# Patient Record
Sex: FEMALE | Race: WHITE | NOT HISPANIC OR LATINO | Employment: STUDENT | URBAN - METROPOLITAN AREA
[De-identification: names, ages, dates, MRNs, and addresses within clinical notes are randomized per-mention and may not be internally consistent; named-entity substitution may affect disease eponyms.]

---
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
## 2018-01-12 VITALS
SYSTOLIC BLOOD PRESSURE: 102 MMHG | BODY MASS INDEX: 27.06 KG/M2 | OXYGEN SATURATION: 98 % | HEIGHT: 70 IN | RESPIRATION RATE: 16 BRPM | TEMPERATURE: 97.7 F | DIASTOLIC BLOOD PRESSURE: 80 MMHG | HEART RATE: 92 BPM | WEIGHT: 189 LBS

## 2018-01-13 VITALS — TEMPERATURE: 98.4 F | BODY MASS INDEX: 23.46 KG/M2 | WEIGHT: 173 LBS

## 2018-01-13 VITALS
DIASTOLIC BLOOD PRESSURE: 80 MMHG | BODY MASS INDEX: 25.77 KG/M2 | HEIGHT: 70 IN | WEIGHT: 180 LBS | SYSTOLIC BLOOD PRESSURE: 102 MMHG

## 2018-01-14 VITALS
TEMPERATURE: 98.6 F | HEART RATE: 82 BPM | OXYGEN SATURATION: 97 % | WEIGHT: 178 LBS | RESPIRATION RATE: 16 BRPM | SYSTOLIC BLOOD PRESSURE: 110 MMHG | HEIGHT: 70 IN | DIASTOLIC BLOOD PRESSURE: 68 MMHG | BODY MASS INDEX: 25.48 KG/M2

## 2018-01-14 VITALS
WEIGHT: 175 LBS | HEIGHT: 70 IN | OXYGEN SATURATION: 98 % | DIASTOLIC BLOOD PRESSURE: 70 MMHG | TEMPERATURE: 98.5 F | RESPIRATION RATE: 16 BRPM | HEART RATE: 91 BPM | SYSTOLIC BLOOD PRESSURE: 120 MMHG | BODY MASS INDEX: 25.05 KG/M2

## 2018-01-14 VITALS — HEART RATE: 78 BPM | RESPIRATION RATE: 18 BRPM | WEIGHT: 174.5 LBS | TEMPERATURE: 98.6 F | BODY MASS INDEX: 23.67 KG/M2

## 2018-01-14 VITALS — TEMPERATURE: 98.5 F | WEIGHT: 175 LBS | BODY MASS INDEX: 23.73 KG/M2

## 2018-01-14 VITALS
DIASTOLIC BLOOD PRESSURE: 70 MMHG | WEIGHT: 174 LBS | BODY MASS INDEX: 24.91 KG/M2 | HEIGHT: 70 IN | SYSTOLIC BLOOD PRESSURE: 100 MMHG

## 2018-01-15 VITALS
SYSTOLIC BLOOD PRESSURE: 104 MMHG | BODY MASS INDEX: 25.05 KG/M2 | HEIGHT: 70 IN | DIASTOLIC BLOOD PRESSURE: 70 MMHG | WEIGHT: 175 LBS

## 2018-01-22 VITALS — WEIGHT: 172 LBS | BODY MASS INDEX: 23.65 KG/M2 | TEMPERATURE: 98 F

## 2018-07-16 ENCOUNTER — ANNUAL EXAM (OUTPATIENT)
Dept: OBGYN CLINIC | Facility: CLINIC | Age: 18
End: 2018-07-16
Payer: COMMERCIAL

## 2018-07-16 VITALS
SYSTOLIC BLOOD PRESSURE: 102 MMHG | BODY MASS INDEX: 24.35 KG/M2 | WEIGHT: 179.8 LBS | DIASTOLIC BLOOD PRESSURE: 70 MMHG | HEIGHT: 72 IN

## 2018-07-16 DIAGNOSIS — Z30.41 ENCOUNTER FOR SURVEILLANCE OF CONTRACEPTIVE PILLS: Primary | ICD-10-CM

## 2018-07-16 DIAGNOSIS — Z11.3 SCREENING FOR STD (SEXUALLY TRANSMITTED DISEASE): ICD-10-CM

## 2018-07-16 DIAGNOSIS — N76.0 ACUTE VAGINITIS: ICD-10-CM

## 2018-07-16 PROCEDURE — 99213 OFFICE O/P EST LOW 20 MIN: CPT | Performed by: OBSTETRICS & GYNECOLOGY

## 2018-07-16 RX ORDER — IBUPROFEN 800 MG/1
TABLET ORAL
COMMUNITY
End: 2018-07-30

## 2018-07-16 RX ORDER — ALBUTEROL SULFATE 90 UG/1
1-2 AEROSOL, METERED RESPIRATORY (INHALATION) EVERY 6 HOURS PRN
COMMUNITY
Start: 2017-05-24

## 2018-07-16 RX ORDER — FLUCONAZOLE 150 MG/1
150 TABLET ORAL ONCE
Qty: 1 TABLET | Refills: 0 | Status: SHIPPED | OUTPATIENT
Start: 2018-07-16 | End: 2018-07-16

## 2018-07-16 RX ORDER — NORGESTIMATE AND ETHINYL ESTRADIOL 7DAYSX3 28
1 KIT ORAL DAILY
Qty: 84 TABLET | Refills: 3 | Status: SHIPPED | OUTPATIENT
Start: 2018-07-16 | End: 2018-08-01 | Stop reason: SDUPTHER

## 2018-07-16 RX ORDER — NORGESTIMATE AND ETHINYL ESTRADIOL 7DAYSX3 28
1 KIT ORAL DAILY
Refills: 0 | COMMUNITY
Start: 2018-05-12 | End: 2018-07-16 | Stop reason: SDUPTHER

## 2018-07-16 NOTE — PROGRESS NOTES
Assessment/Plan:     Cultures obtained for bacteria vaginosis, Candida, leukorrhea panel  Patient will call for results in 1 week  She will start Diflucan x1 dose  She expresses concerns regarding recurrent bacteria vaginosis as she did test positive in October  Continue OCPs x1 year  Return to office in 1 year     Diagnoses and all orders for this visit:    Encounter for surveillance of contraceptive pills    Screening for STD (sexually transmitted disease)    Other orders  -     albuterol (VENTOLIN HFA) 90 mcg/act inhaler; Inhale 1-2 puffs  -     ibuprofen (MOTRIN) 800 mg tablet; Take by mouth  -     TRI FEMYNOR 0 18/0 215/0 25 MG-35 MCG per tablet; Take 1 tablet by mouth daily          Subjective:     Patient ID: Lennox Lester is a 21 y o  female  HPI     This is a 27-year-old female G0 who presents complaining of intermittent vaginal discharge with odor  She started birth control pills approximately 1 year ago which had improved her menstrual flow and cramping  Her cycles are now regular every 4 weeks lasting 5 days with no breakthrough bleeding  She has been sexually active with a new partner in the last 6 months  She does use condoms intermittently  Review of Systems   Constitutional: Negative for fatigue, fever and unexpected weight change  Respiratory: Negative for cough, chest tightness, shortness of breath and wheezing  Cardiovascular: Negative  Negative for chest pain and palpitations  Gastrointestinal: Negative  Negative for abdominal distention, abdominal pain, blood in stool, constipation, diarrhea, nausea and vomiting  Genitourinary: Positive for vaginal discharge  Negative for difficulty urinating, dyspareunia, dysuria, flank pain, frequency, genital sores, hematuria, pelvic pain, urgency, vaginal bleeding and vaginal pain  Skin: Negative for rash  Objective:     Physical Exam      External genitalia is evident of slight erythema  The vagina is well estrogenized  Cervix is small the os is closed  She does have a watery discharge  Vaginal pH is normal there is also adherent discharge the vaginal walls consistent with candidiasis  Cervix reveals no lesions

## 2018-07-18 LAB
A VAGINAE DNA VAG NAA+PROBE-LOG#: 5.7 LOG (CELLS/ML)
C GLABRATA DNA VAG QL NAA+PROBE: NOT DETECTED
C TRACH RRNA SPEC QL NAA+PROBE: NOT DETECTED
CANDIDA DNA VAG QL NAA+PROBE: DETECTED
G VAGINALIS DNA VAG NAA+PROBE-LOG#: >8 LOG (CELLS/ML)
LACTOBACILLUS DNA VAG NAA+PROBE-LOG#: 7.2 LOG (CELLS/ML)
MEGASPHAERA SP DNA VAG NAA+PROBE-LOG#: NOT DETECTED LOG (CELLS/ML)
N GONORRHOEA RRNA SPEC QL NAA+PROBE: NOT DETECTED
SL AMB BV CATEGORY:: ABNORMAL
SL AMB C. PARAPSILOSIS, DNA: NOT DETECTED
SL AMB C. TROPICALIS, DNA: NOT DETECTED
T VAGINALIS RRNA SPEC QL NAA+PROBE: NOT DETECTED

## 2018-07-19 DIAGNOSIS — N76.0 ACUTE VAGINITIS: Primary | ICD-10-CM

## 2018-07-19 RX ORDER — METRONIDAZOLE 500 MG/1
500 TABLET ORAL EVERY 8 HOURS SCHEDULED
Qty: 14 TABLET | Refills: 0 | Status: SHIPPED | OUTPATIENT
Start: 2018-07-19 | End: 2018-07-26

## 2018-07-20 ENCOUNTER — TELEPHONE (OUTPATIENT)
Dept: OBGYN CLINIC | Facility: CLINIC | Age: 18
End: 2018-07-20

## 2018-07-20 NOTE — TELEPHONE ENCOUNTER
----- Message from Beto Coppola DO sent at 7/19/2018  4:36 PM EDT -----  Inform pt cultures c/w candida, already tx w diflucan  Also "equivical BV"   Can try flagyl x 7 days, I sent to [facility]

## 2018-07-24 NOTE — TELEPHONE ENCOUNTER
Pt informed - pt took Diflucan - sx not resolved - will take flagyl now x 7 days - recall if sx persist after tx

## 2018-07-30 ENCOUNTER — OFFICE VISIT (OUTPATIENT)
Dept: PEDIATRICS CLINIC | Facility: CLINIC | Age: 18
End: 2018-07-30
Payer: COMMERCIAL

## 2018-07-30 VITALS — WEIGHT: 177.2 LBS | TEMPERATURE: 98.4 F | HEART RATE: 80 BPM | RESPIRATION RATE: 16 BRPM | BODY MASS INDEX: 24.03 KG/M2

## 2018-07-30 DIAGNOSIS — H10.32 ACUTE BACTERIAL CONJUNCTIVITIS OF LEFT EYE: Primary | ICD-10-CM

## 2018-07-30 PROCEDURE — 99214 OFFICE O/P EST MOD 30 MIN: CPT | Performed by: PEDIATRICS

## 2018-07-30 RX ORDER — CIPROFLOXACIN HYDROCHLORIDE 3.5 MG/ML
SOLUTION/ DROPS TOPICAL
Qty: 5 ML | Refills: 0 | Status: SHIPPED | OUTPATIENT
Start: 2018-07-30 | End: 2018-08-04

## 2018-07-30 RX ORDER — METRONIDAZOLE 500 MG/1
TABLET ORAL EVERY 8 HOURS SCHEDULED
COMMUNITY
End: 2018-12-31 | Stop reason: ALTCHOICE

## 2018-07-30 NOTE — PROGRESS NOTES
Assessment/Plan:    No problem-specific Assessment & Plan notes found for this encounter  Diagnoses and all orders for this visit:    Acute bacterial conjunctivitis of left eye  -     ciprofloxacin (CILOXAN) 0 3 % ophthalmic solution; 1 drop on affected eye bid for 5 days    Other orders  -     metroNIDAZOLE (FLAGYL) 500 mg tablet; Take by mouth every 8 (eight) hours          Subjective: pink eye     Patient ID: Leodan Bennett is a 21 y o  female  HPI 18 y/o who started with a left eye being red and guppy,this am was pasted shot,she gets them often,no other symptoms no fever    The following portions of the patient's history were reviewed and updated as appropriate: allergies, current medications, past family history, past medical history, past social history, past surgical history and problem list     Review of Systems   Constitutional: Negative  HENT: Negative  Eyes: Positive for discharge and redness  Respiratory: Negative  Cardiovascular: Negative  Gastrointestinal: Negative  Endocrine: Negative  Genitourinary: Negative  Musculoskeletal: Negative  Skin: Negative  Allergic/Immunologic: Negative  Neurological: Negative  Hematological: Negative  Psychiatric/Behavioral: Negative  Objective:      Pulse 80   Temp 98 4 °F (36 9 °C) (Oral)   Resp 16   Wt 80 4 kg (177 lb 3 2 oz)   LMP 07/04/2018 (Approximate)   BMI 24 03 kg/m²          Physical Exam   Constitutional: She appears well-developed and well-nourished  HENT:   Head: Normocephalic and atraumatic  Right Ear: External ear normal    Nose: Nose normal    Eyes: Conjunctivae and EOM are normal  Pupils are equal, round, and reactive to light  Right eye exhibits discharge  Neck: Normal range of motion  Neck supple  Cardiovascular: Normal rate, regular rhythm and intact distal pulses  No murmur heard  Pulmonary/Chest: Effort normal and breath sounds normal  No respiratory distress     Abdominal: Soft    Musculoskeletal: Normal range of motion  Neurological: She is alert  Skin: Skin is warm  Vitals reviewed

## 2018-08-01 DIAGNOSIS — Z30.41 ENCOUNTER FOR SURVEILLANCE OF CONTRACEPTIVE PILLS: ICD-10-CM

## 2018-08-05 RX ORDER — NORGESTIMATE AND ETHINYL ESTRADIOL 7DAYSX3 28
KIT ORAL
Qty: 84 TABLET | Refills: 0 | Status: SHIPPED | OUTPATIENT
Start: 2018-08-05 | End: 2020-03-30 | Stop reason: SDUPTHER

## 2018-12-31 ENCOUNTER — OFFICE VISIT (OUTPATIENT)
Dept: OBGYN CLINIC | Facility: CLINIC | Age: 18
End: 2018-12-31
Payer: COMMERCIAL

## 2018-12-31 VITALS
DIASTOLIC BLOOD PRESSURE: 70 MMHG | SYSTOLIC BLOOD PRESSURE: 110 MMHG | WEIGHT: 178 LBS | BODY MASS INDEX: 24.11 KG/M2 | HEIGHT: 72 IN

## 2018-12-31 DIAGNOSIS — N89.8 VAGINAL DISCHARGE: Primary | ICD-10-CM

## 2018-12-31 PROBLEM — J45.990 BRONCHOSPASM, EXERCISE-INDUCED: Status: ACTIVE | Noted: 2017-06-05

## 2018-12-31 PROBLEM — J30.2 SEASONAL ALLERGIC RHINITIS: Status: ACTIVE | Noted: 2017-06-05

## 2018-12-31 PROBLEM — N63.0 BREAST LUMP IN FEMALE: Status: ACTIVE | Noted: 2017-03-15

## 2018-12-31 PROBLEM — N94.6 DYSMENORRHEA: Status: ACTIVE | Noted: 2017-05-19

## 2018-12-31 PROCEDURE — 99213 OFFICE O/P EST LOW 20 MIN: CPT | Performed by: NURSE PRACTITIONER

## 2018-12-31 NOTE — PATIENT INSTRUCTIONS
HPV (Human Papillomavirus)   AMBULATORY CARE:   Human Papillomavirus (HPV)  is the most common infection spread by sexual contact  It can also be spread from a mother to her baby during delivery  HPV may cause oral and genital warts or tumors in your nose, mouth, throat, and lungs  HPV may also cause vaginal, penile, and anal cancers  You may not show symptoms of any of these conditions for several years after being exposed to HPV  Common symptoms include the following:   · Painless warts    · Genital or anal discharge, bleeding, itching, or pain    · Pain when you urinate  Contact your healthcare provider if:   · You have warts in your genital or anal area  · You have genital or anal discharge, bleeding, itching, or pain  · You have pain when you urinate  · You have questions or concerns about your condition or care  Treatment for HPV  includes relieving symptoms  There is no cure for HPV  There is treatment for the conditions that are caused by HPV  You will need to be closely monitored for these conditions  Ask your healthcare provider for more information about monitoring, conditions caused by HPV, and treatments  Prevent HPV infection:  Vaccinations can help stop the spread of HPV  The vaccine is most effective if given before sexual activity begins  This allows your body to build almost complete protection against HPV before having contact with the virus  The HPV vaccine is still effective up to the age of 32 if it is given after sexual activity has already begun  Who should get the HPV vaccine: The first dose of the vaccine may be given as early as 5years of age   The following should also get the vaccine:  · All females and males 6to 15years of age    · Females 15 through 32years of age, and males 15 through 24years of age, who have not been vaccinated     · Men up to 32years of age who have sex with other men, and have not been vaccinated     · Anyone with a weak immune system, including infection with HIV, up to 32years of age  Who should not get the vaccine or should wait to get it:  Do not get a second dose if you had a severe allergic reaction to the first dose  Do not get the vaccine if you are pregnant  If you are sick, wait to get the vaccine until symptoms go away  How the vaccine is given:  The HPV vaccine can be given with other vaccinations  The vaccine is given in 3 doses to adults:  · The first dose  is given at any time  · The second dose  is given 1 to 2 months after the first dose  · The third dose  is given 6 months after the first dose  More information about how the vaccine is given: You may need 1 more dose of the HPV vaccine if any of the following is true:  · You only received 1 dose of the HPV vaccine before 13years of age    · You received 2 doses of the HPV vaccine less than 5 months apart before 13years of age  Follow up with your healthcare provider as directed:  Write down your questions so you remember to ask them during your visits  © 2017 [facility] [facility] Information is for End User's use only and may not be sold, redistributed or otherwise used for commercial purposes  All illustrations and images included in CareNotes® are the copyrighted property of [employer]  or [address]  The above information is an  only  It is not intended as medical advice for individual conditions or treatments  Talk to your doctor, nurse or pharmacist before following any medical regimen to see if it is safe and effective for you

## 2018-12-31 NOTE — PROGRESS NOTES
Brian Pardo is a 19 y o  female who presents for evaluation of an abnormal vaginal discharge  Symptoms have been present for 2 weeks  Vaginal symptoms: discharge described as creamy, curd-like and milky, odor and vulvar itching  Contraception: OCP (estrogen/progesterone)  She denies blisters, bumps, burning and pain Sexually transmitted infection risk: possible STD exposure  The following portions of the patient's history were reviewed and updated as appropriate: allergies, current medications, past family history, past medical history, past social history, past surgical history and problem list     Review of Systems  Pertinent items are noted in HPI       Objective     Physical Exam   Constitutional: Vital signs are normal  She appears well-developed and well-nourished  Genitourinary: Vagina normal  Pelvic exam was performed with patient supine  There is no rash, tenderness or lesion on the right labia  There is no rash, tenderness or lesion on the left labia  Cervix is nulliparous  Cervix exhibits discharge (small amount of white mucous appearing discharge  No odor)  Cervix does not exhibit friability  HENT:   Head: Normocephalic and atraumatic  Neck: Neck supple  Neurological: She is alert  Nursing note and vitals reviewed  Assessment/Plan   Nora Galarza was seen today for vaginitis  Diagnoses and all orders for this visit:    Vaginal discharge  -     Sureswab(R), Vaginosis/Vaginitis Plus      Will inform patient of results when they become available  If treatment is warranted it will be sent to pharmacy on file  All questions and concerns addressed and patient verbalized understanding

## 2019-01-03 LAB
A VAGINAE DNA VAG NAA+PROBE-LOG#: 6.2 LOG (CELLS/ML)
C GLABRATA DNA VAG QL NAA+PROBE: NOT DETECTED
C TRACH RRNA SPEC QL NAA+PROBE: NOT DETECTED
CANDIDA DNA VAG QL NAA+PROBE: DETECTED
G VAGINALIS DNA VAG NAA+PROBE-LOG#: >8 LOG (CELLS/ML)
LACTOBACILLUS DNA VAG NAA+PROBE-LOG#: NOT DETECTED LOG (CELLS/ML)
MEGASPHAERA SP DNA VAG NAA+PROBE-LOG#: NOT DETECTED LOG (CELLS/ML)
N GONORRHOEA RRNA SPEC QL NAA+PROBE: NOT DETECTED
SL AMB BV CATEGORY:: ABNORMAL
SL AMB C. PARAPSILOSIS, DNA: NOT DETECTED
SL AMB C. TROPICALIS, DNA: NOT DETECTED
T VAGINALIS RRNA SPEC QL NAA+PROBE: NOT DETECTED

## 2019-01-04 ENCOUNTER — TELEPHONE (OUTPATIENT)
Dept: OBGYN CLINIC | Facility: CLINIC | Age: 19
End: 2019-01-04

## 2019-01-04 DIAGNOSIS — B96.89 BACTERIAL VAGINOSIS: Primary | ICD-10-CM

## 2019-01-04 DIAGNOSIS — N76.0 BACTERIAL VAGINOSIS: Primary | ICD-10-CM

## 2019-01-04 RX ORDER — METRONIDAZOLE 500 MG/1
500 TABLET ORAL EVERY 12 HOURS SCHEDULED
Qty: 14 TABLET | Refills: 0 | Status: SHIPPED | OUTPATIENT
Start: 2019-01-04 | End: 2019-01-11

## 2019-01-04 NOTE — TELEPHONE ENCOUNTER
Patient informed of +BV  Flagyl sent to pharmacy  All questions and concerns addressed and patient verbalized understanding  Diagnoses and all orders for this visit:    Bacterial vaginosis  -     metroNIDAZOLE (FLAGYL) 500 mg tablet;  Take 1 tablet (500 mg total) by mouth every 12 (twelve) hours for 7 days

## 2019-06-10 RX ORDER — NORGESTIMATE AND ETHINYL ESTRADIOL 7DAYSX3 28
1 KIT ORAL DAILY
COMMUNITY
End: 2019-06-10

## 2019-06-11 ENCOUNTER — OFFICE VISIT (OUTPATIENT)
Dept: FAMILY MEDICINE CLINIC | Facility: CLINIC | Age: 19
End: 2019-06-11
Payer: COMMERCIAL

## 2019-06-11 ENCOUNTER — TELEPHONE (OUTPATIENT)
Dept: FAMILY MEDICINE CLINIC | Facility: CLINIC | Age: 19
End: 2019-06-11

## 2019-06-11 VITALS
RESPIRATION RATE: 14 BRPM | WEIGHT: 184 LBS | SYSTOLIC BLOOD PRESSURE: 102 MMHG | HEIGHT: 72 IN | TEMPERATURE: 98.2 F | DIASTOLIC BLOOD PRESSURE: 68 MMHG | BODY MASS INDEX: 24.92 KG/M2 | HEART RATE: 72 BPM | OXYGEN SATURATION: 98 %

## 2019-06-11 DIAGNOSIS — F32.1 CURRENT MODERATE EPISODE OF MAJOR DEPRESSIVE DISORDER WITHOUT PRIOR EPISODE (HCC): ICD-10-CM

## 2019-06-11 DIAGNOSIS — Z00.00 ANNUAL PHYSICAL EXAM: Primary | ICD-10-CM

## 2019-06-11 DIAGNOSIS — R53.83 FATIGUE, UNSPECIFIED TYPE: Primary | ICD-10-CM

## 2019-06-11 PROCEDURE — 3008F BODY MASS INDEX DOCD: CPT | Performed by: FAMILY MEDICINE

## 2019-06-11 PROCEDURE — 99385 PREV VISIT NEW AGE 18-39: CPT | Performed by: FAMILY MEDICINE

## 2019-06-11 PROCEDURE — 1036F TOBACCO NON-USER: CPT | Performed by: FAMILY MEDICINE

## 2019-06-11 PROCEDURE — 99213 OFFICE O/P EST LOW 20 MIN: CPT | Performed by: FAMILY MEDICINE

## 2019-06-11 RX ORDER — SERTRALINE HYDROCHLORIDE 25 MG/1
TABLET, FILM COATED ORAL
Qty: 60 TABLET | Refills: 5 | Status: SHIPPED | OUTPATIENT
Start: 2019-06-11 | End: 2020-11-25 | Stop reason: ALTCHOICE

## 2019-06-12 LAB
25(OH)D3 SERPL-MCNC: 32 NG/ML (ref 30–100)
ALBUMIN SERPL-MCNC: 4.4 G/DL (ref 3.6–5.1)
ALBUMIN/GLOB SERPL: 1.8 (CALC) (ref 1–2.5)
ALP SERPL-CCNC: 63 U/L (ref 33–115)
ALT SERPL-CCNC: 22 U/L (ref 6–29)
AST SERPL-CCNC: 20 U/L (ref 10–30)
BASOPHILS # BLD AUTO: 52 CELLS/UL (ref 0–200)
BASOPHILS NFR BLD AUTO: 0.7 %
BILIRUB SERPL-MCNC: 0.8 MG/DL (ref 0.2–1.2)
BUN SERPL-MCNC: 15 MG/DL (ref 7–25)
BUN/CREAT SERPL: NORMAL (CALC) (ref 6–22)
CALCIUM SERPL-MCNC: 9.5 MG/DL (ref 8.6–10.2)
CHLORIDE SERPL-SCNC: 106 MMOL/L (ref 98–110)
CO2 SERPL-SCNC: 27 MMOL/L (ref 20–32)
CREAT SERPL-MCNC: 0.77 MG/DL (ref 0.5–1.1)
EOSINOPHIL # BLD AUTO: 200 CELLS/UL (ref 15–500)
EOSINOPHIL NFR BLD AUTO: 2.7 %
ERYTHROCYTE [DISTWIDTH] IN BLOOD BY AUTOMATED COUNT: 12.1 % (ref 11–15)
FERRITIN SERPL-MCNC: 45 NG/ML (ref 10–154)
GLOBULIN SER CALC-MCNC: 2.5 G/DL (CALC) (ref 1.9–3.7)
GLUCOSE SERPL-MCNC: 78 MG/DL (ref 65–99)
HCT VFR BLD AUTO: 40.7 % (ref 35–45)
HGB BLD-MCNC: 13.4 G/DL (ref 11.7–15.5)
LYMPHOCYTES # BLD AUTO: 1761 CELLS/UL (ref 850–3900)
LYMPHOCYTES NFR BLD AUTO: 23.8 %
MCH RBC QN AUTO: 30.1 PG (ref 27–33)
MCHC RBC AUTO-ENTMCNC: 32.9 G/DL (ref 32–36)
MCV RBC AUTO: 91.5 FL (ref 80–100)
MONOCYTES # BLD AUTO: 555 CELLS/UL (ref 200–950)
MONOCYTES NFR BLD AUTO: 7.5 %
NEUTROPHILS # BLD AUTO: 4832 CELLS/UL (ref 1500–7800)
NEUTROPHILS NFR BLD AUTO: 65.3 %
PLATELET # BLD AUTO: 186 THOUSAND/UL (ref 140–400)
PMV BLD REES-ECKER: 11.3 FL (ref 7.5–12.5)
POTASSIUM SERPL-SCNC: 4.4 MMOL/L (ref 3.5–5.3)
PROT SERPL-MCNC: 6.9 G/DL (ref 6.1–8.1)
RBC # BLD AUTO: 4.45 MILLION/UL (ref 3.8–5.1)
SL AMB EGFR AFRICAN AMERICAN: 129 ML/MIN/1.73M2
SL AMB EGFR NON AFRICAN AMERICAN: 111 ML/MIN/1.73M2
SODIUM SERPL-SCNC: 141 MMOL/L (ref 135–146)
TSH SERPL-ACNC: 0.89 MIU/L
WBC # BLD AUTO: 7.4 THOUSAND/UL (ref 3.8–10.8)

## 2019-08-19 ENCOUNTER — OFFICE VISIT (OUTPATIENT)
Dept: FAMILY MEDICINE CLINIC | Facility: CLINIC | Age: 19
End: 2019-08-19
Payer: COMMERCIAL

## 2019-08-19 ENCOUNTER — HOSPITAL ENCOUNTER (OUTPATIENT)
Dept: RADIOLOGY | Facility: HOSPITAL | Age: 19
Discharge: HOME | End: 2019-08-19
Attending: FAMILY MEDICINE
Payer: COMMERCIAL

## 2019-08-19 ENCOUNTER — TELEPHONE (OUTPATIENT)
Dept: FAMILY MEDICINE CLINIC | Facility: CLINIC | Age: 19
End: 2019-08-19

## 2019-08-19 ENCOUNTER — TELEPHONE (OUTPATIENT)
Dept: OTHER | Facility: OTHER | Age: 19
End: 2019-08-19

## 2019-08-19 VITALS
WEIGHT: 184 LBS | OXYGEN SATURATION: 98 % | RESPIRATION RATE: 16 BRPM | HEART RATE: 70 BPM | BODY MASS INDEX: 24.92 KG/M2 | TEMPERATURE: 98.4 F | DIASTOLIC BLOOD PRESSURE: 79 MMHG | HEIGHT: 72 IN | SYSTOLIC BLOOD PRESSURE: 98 MMHG

## 2019-08-19 DIAGNOSIS — M25.572 ACUTE LEFT ANKLE PAIN: ICD-10-CM

## 2019-08-19 DIAGNOSIS — M25.572 ACUTE LEFT ANKLE PAIN: Primary | ICD-10-CM

## 2019-08-19 PROCEDURE — 3008F BODY MASS INDEX DOCD: CPT | Performed by: FAMILY MEDICINE

## 2019-08-19 PROCEDURE — 73610 X-RAY EXAM OF ANKLE: CPT

## 2019-08-19 PROCEDURE — 99213 OFFICE O/P EST LOW 20 MIN: CPT | Performed by: FAMILY MEDICINE

## 2019-08-19 PROCEDURE — 73630 X-RAY EXAM OF FOOT: CPT

## 2019-08-19 PROCEDURE — 1036F TOBACCO NON-USER: CPT | Performed by: FAMILY MEDICINE

## 2019-08-19 NOTE — TELEPHONE ENCOUNTER
Sent this Message to Dr Robinson Carias via Kendal @ [phone]    [phone]/[facility] [employer] [facility] [facility]/ Wilfrido Davis  25- / Stat x-ray Results    Alberto Dias to call back in 20-30 minutes if no call back from Dr Robinson Carias

## 2019-08-19 NOTE — PROGRESS NOTES
Assessment/Plan:   Greer Cogan was seen today for ankle injury  Diagnoses and all orders for this visit:    Acute left ankle pain    suspect fracture vs torn ligament   x-ray stat  Ice and elevate  Stay in boot   Plan to ortho or sports med based on results     There are no Patient Instructions on file for this visit  Return in about 1 month (around 9/19/2019) for mood check with Dr Ayde Rodrigues   Subjective:    HPI  Greer Cogan is a 22 y o  female who presents with:  Chief Complaint     Ankle Injury        HPI     Ankle Injury      Additional comments: x 2 wks           Last edited by Farhad Contreras MA on 8/19/2019  4:21 PM  (History)        ---Above per clinical staff & reviewed  ---        Running in sand, rolled ankle   Ice and elevated it   Runs so talked to    Walking after 5 minutes pain and tingling   Swelling immediately   Black and blue at bottom of foot   Pain next day severe hard to walk 7/10 pain   No injury on that ankle but knee and hip surgery on that side    Dr Jaylin Mirza at [place] [phone]    On ocp for week   Not SA for over a month       The following portions of the patient's history were reviewed and updated as appropriate: allergies, current medications, past family history, past medical history, past social history, past surgical history and problem list   Review of Systems  ROS:  all others negative - no chest pain, SOB, normal urine and bowels  no GERD  sleeping well  mood good    Objective:    BP 98/79 (BP Location: Left arm)   Pulse 70   Temp 98 4 °F (36 9 °C)   Resp 16   Ht 6' (1 829 m)   Wt 83 5 kg (184 lb)   SpO2 98%   BMI 24 95 kg/m²   Wt Readings from Last 3 Encounters:   08/19/19 83 5 kg (184 lb)   06/11/19 83 5 kg (184 lb)   12/31/18 80 7 kg (178 lb)     BP Readings from Last 3 Encounters:   08/19/19 98/79   06/11/19 102/68   12/31/18 110/70     Current Outpatient Medications   Medication Sig Dispense Refill    albuterol (VENTOLIN HFA) 90 mcg/act inhaler Inhale 1-2 puffs every 6 (six) hours as needed for wheezing       sertraline (ZOLOFT) 25 mg tablet 1/2 tab daily x 1 week, then 1 tab daily x 1 week, then 1 1/2 tabs daily x 1 week, then 2 tabs daily 60 tablet 5    TRI FEMYNOR 0 18/0 215/0 25 MG-35 MCG per tablet TAKE 1 TABLET BY MOUTH EVERY DAY 84 tablet 0     No current facility-administered medications for this visit  Physical Exam   Musculoskeletal:        Left ankle: She exhibits decreased range of motion and swelling  She exhibits no deformity, no laceration and normal pulse  Tenderness  Lateral malleolus, CF ligament and head of 5th metatarsal tenderness found  No medial malleolus and no proximal fibula tenderness found  Feet:       Constitutional: she appears well-developed and well-nourished  Lymphadenopathy: she has no cervical adenopathy  Neurological: she is alert and oriented to person, place, and time  Skin: Skin is warm and dry  Psychiatric: she has a normal mood and affect   her behavior is normal

## 2019-08-19 NOTE — TELEPHONE ENCOUNTER
Concern: broke left foot,[place]  Symptoms: pain, swollen and bruised  Duration: 2weeks  Remedies tried at home for relief: in boot by [employer]

## 2019-08-19 NOTE — TELEPHONE ENCOUNTER
Placed call to patient, left foot bruising (darker in color, was purple but has gone down since injury was 2 weeks ago),  painful to walk on without boot  Can wiggle toes and has feeling in a digits  Has been in a boot given to her by  at [facility]  Patient is currently home now  Patient is requesting an xray as she did not have one  Advised id check with provider in office or if patient needs to come in for a visit

## 2019-08-20 ENCOUNTER — TELEPHONE (OUTPATIENT)
Dept: FAMILY MEDICINE CLINIC | Facility: CLINIC | Age: 19
End: 2019-08-20

## 2019-08-20 NOTE — TELEPHONE ENCOUNTER
Placed call to patient - reviewed Dr Jeananne Buerger recommendation as noted on the xray result on 08/19/19 "there is no fracture in the foot or ankle  I recommend she follow up with the sports medicine doctor at this time  I believe she wants to do this at school "    Patient understood and had no additional questions

## 2019-08-20 NOTE — TELEPHONE ENCOUNTER
Received call from [facility]  STAT xray of foot/ankle was negative for fracture  I called pt with results

## 2019-08-20 NOTE — RESULT ENCOUNTER NOTE
Call patient with x-ray results - there is no fracture in the foot or ankle  I recommend she follow up with the sports medicine doctor at this time  I believe she wants to do this at school

## 2019-08-20 NOTE — TELEPHONE ENCOUNTER
Concern: injured foot   Symptoms:  Duration:  Remedies tried at home for relief: boot and had x-ray  Knows the x-ray was negative  Asking if she should have a MRI done   Please let the patient know

## 2019-10-18 ENCOUNTER — OFFICE VISIT (OUTPATIENT)
Dept: FAMILY MEDICINE CLINIC | Facility: CLINIC | Age: 19
End: 2019-10-18
Payer: COMMERCIAL

## 2019-10-18 VITALS
HEIGHT: 72 IN | HEART RATE: 88 BPM | OXYGEN SATURATION: 98 % | WEIGHT: 178.4 LBS | RESPIRATION RATE: 16 BRPM | BODY MASS INDEX: 24.16 KG/M2 | DIASTOLIC BLOOD PRESSURE: 72 MMHG | TEMPERATURE: 97.8 F | SYSTOLIC BLOOD PRESSURE: 104 MMHG

## 2019-10-18 DIAGNOSIS — F32.A DEPRESSIVE DISORDER: ICD-10-CM

## 2019-10-18 DIAGNOSIS — R11.2 NAUSEA AND VOMITING, INTRACTABILITY OF VOMITING NOT SPECIFIED, UNSPECIFIED VOMITING TYPE: Primary | ICD-10-CM

## 2019-10-18 LAB — SL AMB POCT URINE HCG: NEGATIVE

## 2019-10-18 PROCEDURE — 3008F BODY MASS INDEX DOCD: CPT | Performed by: FAMILY MEDICINE

## 2019-10-18 PROCEDURE — 99214 OFFICE O/P EST MOD 30 MIN: CPT | Performed by: FAMILY MEDICINE

## 2019-10-18 PROCEDURE — 81025 URINE PREGNANCY TEST: CPT | Performed by: FAMILY MEDICINE

## 2019-10-18 RX ORDER — OMEPRAZOLE 20 MG/1
20 CAPSULE, DELAYED RELEASE ORAL DAILY
Qty: 30 CAPSULE | Refills: 3 | Status: SHIPPED | OUTPATIENT
Start: 2019-10-18 | End: 2020-11-25 | Stop reason: ALTCHOICE

## 2019-10-18 NOTE — PROGRESS NOTES
Assessment/Plan:    No problem-specific Assessment & Plan notes found for this encounter  Diagnoses and all orders for this visit:    Nausea and vomiting, intractability of vomiting not specified, unspecified vomiting type  Cause unclear  Epigastric tenderness, so will start meds for reflux--omeprazole 20mg daily   Labs as ordered  Refer to GI unless all symptoms resolve on omeprazole  Urine preg negative    -     POCT urine HCG  -     Celiac Disease Antibody Profile; Future  -     Lipase; Future  -     omeprazole (PriLOSEC) 20 mg delayed release capsule; Take 1 capsule (20 mg total) by mouth daily  -     Ambulatory referral to Gastroenterology; Future  -     Ambulatory referral to Gastroenterology; Future      Depressive disorder         improved per pt  she does not want to increase dose at this time  continue f/u with counselor  continue zoloft 50mg      Subjective:      Patient ID: Power Ojeda is a 22 y o  female  HPI    Pt presents c/o about a month of decreased appetite  Feels full all the time  When she does eat, she has nausea and often vomiting  No fevers  Has been having more frequent bowel mvmts  No diarrhea/constipation  No blood in stool  Has occasional sense of epigastric cramping which is infrequent and lasts a few hours goes away on its own  No recent travel  Has tried nothing for symptoms  Pt is still taking zoloft 50mg daily  She feels well on this med and hasn't felt the need to see psych  Worry is far less frequent  Grades are better and she is going to class  Doesn't feel sad or hopeless  Does feel a little "down" at times, but she is seeing her counselor at school and feels she is doing well  Would like to continue same dose  No si/hi          The following portions of the patient's history were reviewed and updated as appropriate: allergies, current medications, past family history, past medical history, past social history, past surgical history and problem list     Review of Systems    See HPI; All other systems negative      Objective:      /72   Pulse 88   Temp 97 8 °F (36 6 °C) (Tympanic)   Resp 16   Ht 6' (1 829 m)   Wt 80 9 kg (178 lb 6 4 oz)   SpO2 98%   BMI 24 20 kg/m²          Physical Exam   Constitutional: She is oriented to person, place, and time  She appears well-developed and well-nourished  No distress  HENT:   Head: Normocephalic and atraumatic  Right Ear: Tympanic membrane, external ear and ear canal normal    Left Ear: Tympanic membrane, external ear and ear canal normal    Nose: Nose normal    Mouth/Throat: Oropharynx is clear and moist and mucous membranes are normal  No oropharyngeal exudate  Eyes: Pupils are equal, round, and reactive to light  Conjunctivae and EOM are normal    Neck: No JVD present  Carotid bruit is not present  No thyromegaly present  Cardiovascular: Regular rhythm, S1 normal and S2 normal  Exam reveals no gallop, no S3, no S4 and no friction rub  No murmur heard  Pulmonary/Chest: Effort normal and breath sounds normal  She has no wheezes  She has no rhonchi  She has no rales  Abdominal: Soft  Bowel sounds are normal  She exhibits no distension  There is tenderness in the epigastric area  There is no rigidity, no rebound, no guarding, no tenderness at McBurney's point and negative Phelps's sign  Lymphadenopathy:     She has no cervical adenopathy  Neurological: She is alert and oriented to person, place, and time  She has normal strength and normal reflexes  No cranial nerve deficit or sensory deficit  Psychiatric: She has a normal mood and affect   Her speech is normal and behavior is normal  Judgment and thought content normal  Cognition and memory are normal

## 2019-10-21 LAB
IGA SERPL-MCNC: 174 MG/DL (ref 47–310)
LIPASE SERPL-CCNC: 12 U/L (ref 7–60)
TTG IGA SER-ACNC: 1 U/ML

## 2020-03-30 DIAGNOSIS — Z30.41 ENCOUNTER FOR SURVEILLANCE OF CONTRACEPTIVE PILLS: ICD-10-CM

## 2020-03-30 RX ORDER — NORGESTIMATE AND ETHINYL ESTRADIOL 7DAYSX3 28
1 KIT ORAL DAILY
Qty: 84 TABLET | Refills: 0 | Status: SHIPPED | OUTPATIENT
Start: 2020-03-30 | End: 2020-06-21

## 2020-06-05 ENCOUNTER — TELEPHONE (OUTPATIENT)
Dept: OTHER | Facility: OTHER | Age: 20
End: 2020-06-05

## 2020-06-19 DIAGNOSIS — Z30.41 ENCOUNTER FOR SURVEILLANCE OF CONTRACEPTIVE PILLS: ICD-10-CM

## 2020-06-21 RX ORDER — NORGESTIMATE AND ETHINYL ESTRADIOL 7DAYSX3 28
KIT ORAL
Qty: 84 TABLET | Refills: 0 | Status: SHIPPED | OUTPATIENT
Start: 2020-06-21 | End: 2020-11-25 | Stop reason: ALTCHOICE

## 2020-08-20 ENCOUNTER — TELEPHONE (OUTPATIENT)
Dept: FAMILY MEDICINE CLINIC | Facility: CLINIC | Age: 20
End: 2020-08-20

## 2020-08-20 DIAGNOSIS — K08.409 HISTORY OF THIRD MOLAR TOOTH EXTRACTION, UNSPECIFIED EDENTULISM CLASS: Primary | ICD-10-CM

## 2020-08-20 NOTE — TELEPHONE ENCOUNTER
Patient needs a doctor to doctor referral to [address] [place] [address],[address] so that they will schedule her to get her wisdom teeth out    She did get one from her dentist but she also needs one from her PCP, it just needs to be put into [facility]

## 2020-09-25 DIAGNOSIS — Z30.41 ENCOUNTER FOR SURVEILLANCE OF CONTRACEPTIVE PILLS: ICD-10-CM

## 2020-09-25 RX ORDER — NORGESTIMATE AND ETHINYL ESTRADIOL 7DAYSX3 28
KIT ORAL
Qty: 84 TABLET | Refills: 0 | OUTPATIENT
Start: 2020-09-25

## 2020-09-26 DIAGNOSIS — Z30.41 ENCOUNTER FOR SURVEILLANCE OF CONTRACEPTIVE PILLS: ICD-10-CM

## 2020-09-27 RX ORDER — NORGESTIMATE AND ETHINYL ESTRADIOL 7DAYSX3 28
KIT ORAL
Qty: 84 TABLET | Refills: 0 | OUTPATIENT
Start: 2020-09-27

## 2020-09-28 NOTE — TELEPHONE ENCOUNTER
No answer pt's ph# - no voicemail set up, "not available"  No yearly appt scheduled  Last seen 12/31/2018 for vaginitis  Last yearly appt 7/2018

## 2020-10-29 DIAGNOSIS — Z30.41 ENCOUNTER FOR SURVEILLANCE OF CONTRACEPTIVE PILLS: ICD-10-CM

## 2020-10-29 RX ORDER — NORGESTIMATE AND ETHINYL ESTRADIOL 7DAYSX3 28
KIT ORAL
Qty: 84 TABLET | Refills: 0 | OUTPATIENT
Start: 2020-10-29

## 2020-11-06 ENCOUNTER — TELEMEDICINE (OUTPATIENT)
Dept: FAMILY MEDICINE CLINIC | Facility: CLINIC | Age: 20
End: 2020-11-06
Payer: COMMERCIAL

## 2020-11-06 VITALS — BODY MASS INDEX: 26.31 KG/M2 | WEIGHT: 194 LBS | HEART RATE: 60 BPM | TEMPERATURE: 97.1 F

## 2020-11-06 DIAGNOSIS — Z20.828 EXPOSURE TO SARS-ASSOCIATED CORONAVIRUS: Primary | ICD-10-CM

## 2020-11-06 DIAGNOSIS — Z20.828 EXPOSURE TO SARS-ASSOCIATED CORONAVIRUS: ICD-10-CM

## 2020-11-06 PROCEDURE — 3725F SCREEN DEPRESSION PERFORMED: CPT | Performed by: FAMILY MEDICINE

## 2020-11-06 PROCEDURE — 99213 OFFICE O/P EST LOW 20 MIN: CPT | Performed by: FAMILY MEDICINE

## 2020-11-06 PROCEDURE — U0003 INFECTIOUS AGENT DETECTION BY NUCLEIC ACID (DNA OR RNA); SEVERE ACUTE RESPIRATORY SYNDROME CORONAVIRUS 2 (SARS-COV-2) (CORONAVIRUS DISEASE [COVID-19]), AMPLIFIED PROBE TECHNIQUE, MAKING USE OF HIGH THROUGHPUT TECHNOLOGIES AS DESCRIBED BY CMS-2020-01-R: HCPCS | Performed by: FAMILY MEDICINE

## 2020-11-07 LAB — SARS-COV-2 RNA SPEC QL NAA+PROBE: NOT DETECTED

## 2020-11-25 ENCOUNTER — ANNUAL EXAM (OUTPATIENT)
Dept: OBGYN CLINIC | Facility: CLINIC | Age: 20
End: 2020-11-25
Payer: COMMERCIAL

## 2020-11-25 VITALS
HEIGHT: 72 IN | BODY MASS INDEX: 25.33 KG/M2 | WEIGHT: 187 LBS | SYSTOLIC BLOOD PRESSURE: 118 MMHG | DIASTOLIC BLOOD PRESSURE: 76 MMHG

## 2020-11-25 DIAGNOSIS — Z01.419 WOMEN'S ANNUAL ROUTINE GYNECOLOGICAL EXAMINATION: Primary | ICD-10-CM

## 2020-11-25 DIAGNOSIS — Z11.3 SCREENING EXAMINATION FOR SEXUALLY TRANSMITTED DISEASE: ICD-10-CM

## 2020-11-25 PROCEDURE — 3008F BODY MASS INDEX DOCD: CPT | Performed by: NURSE PRACTITIONER

## 2020-11-25 PROCEDURE — 1036F TOBACCO NON-USER: CPT | Performed by: NURSE PRACTITIONER

## 2020-11-25 PROCEDURE — 99395 PREV VISIT EST AGE 18-39: CPT | Performed by: NURSE PRACTITIONER

## 2020-12-01 LAB
C TRACH RRNA SPEC QL NAA+PROBE: NOT DETECTED
CLINICAL INFO: NORMAL
CYTO CVX: NORMAL
CYTOLOGY CMNT CVX/VAG CYTO-IMP: NORMAL
DATE PREVIOUS BX: NORMAL
LMP START DATE: NORMAL
N GONORRHOEA RRNA SPEC QL NAA+PROBE: NOT DETECTED
SL AMB PREV. PAP:: NORMAL
SPECIMEN SOURCE CVX/VAG CYTO: NORMAL

## 2020-12-07 ENCOUNTER — TELEPHONE (OUTPATIENT)
Dept: FAMILY MEDICINE CLINIC | Facility: CLINIC | Age: 20
End: 2020-12-07

## 2020-12-11 ENCOUNTER — TELEPHONE (OUTPATIENT)
Dept: OBGYN CLINIC | Facility: CLINIC | Age: 20
End: 2020-12-11

## 2020-12-21 ENCOUNTER — TELEMEDICINE (OUTPATIENT)
Dept: FAMILY MEDICINE CLINIC | Facility: CLINIC | Age: 20
End: 2020-12-21
Payer: COMMERCIAL

## 2020-12-21 VITALS — HEIGHT: 72 IN | WEIGHT: 190 LBS | BODY MASS INDEX: 25.73 KG/M2 | TEMPERATURE: 96.1 F

## 2020-12-21 DIAGNOSIS — Z03.818 ENCOUNTER FOR OBSERVATION FOR SUSPECTED EXPOSURE TO OTHER BIOLOGICAL AGENTS RULED OUT: ICD-10-CM

## 2020-12-21 DIAGNOSIS — Z03.818 ENCOUNTER FOR OBSERVATION FOR SUSPECTED EXPOSURE TO OTHER BIOLOGICAL AGENTS RULED OUT: Primary | ICD-10-CM

## 2020-12-21 PROCEDURE — U0003 INFECTIOUS AGENT DETECTION BY NUCLEIC ACID (DNA OR RNA); SEVERE ACUTE RESPIRATORY SYNDROME CORONAVIRUS 2 (SARS-COV-2) (CORONAVIRUS DISEASE [COVID-19]), AMPLIFIED PROBE TECHNIQUE, MAKING USE OF HIGH THROUGHPUT TECHNOLOGIES AS DESCRIBED BY CMS-2020-01-R: HCPCS | Performed by: FAMILY MEDICINE

## 2020-12-21 PROCEDURE — 1036F TOBACCO NON-USER: CPT | Performed by: FAMILY MEDICINE

## 2020-12-21 PROCEDURE — 99214 OFFICE O/P EST MOD 30 MIN: CPT | Performed by: FAMILY MEDICINE

## 2020-12-22 LAB — SARS-COV-2 RNA SPEC QL NAA+PROBE: NOT DETECTED

## 2020-12-30 ENCOUNTER — TELEPHONE (OUTPATIENT)
Dept: OBGYN CLINIC | Facility: CLINIC | Age: 20
End: 2020-12-30

## 2020-12-30 DIAGNOSIS — N88.2 CERVICAL STENOSIS (UTERINE CERVIX): Primary | ICD-10-CM

## 2020-12-30 RX ORDER — MISOPROSTOL 200 UG/1
TABLET ORAL
Qty: 3 TABLET | Refills: 0 | Status: SHIPPED | OUTPATIENT
Start: 2020-12-30 | End: 2020-12-31 | Stop reason: ALTCHOICE

## 2020-12-31 ENCOUNTER — PROCEDURE VISIT (OUTPATIENT)
Dept: OBGYN CLINIC | Facility: CLINIC | Age: 20
End: 2020-12-31
Payer: COMMERCIAL

## 2020-12-31 VITALS
BODY MASS INDEX: 27.09 KG/M2 | SYSTOLIC BLOOD PRESSURE: 118 MMHG | DIASTOLIC BLOOD PRESSURE: 78 MMHG | WEIGHT: 200 LBS | HEIGHT: 72 IN

## 2020-12-31 DIAGNOSIS — Z30.430 ENCOUNTER FOR IUD INSERTION: Primary | ICD-10-CM

## 2020-12-31 PROCEDURE — 3008F BODY MASS INDEX DOCD: CPT | Performed by: FAMILY MEDICINE

## 2020-12-31 PROCEDURE — 58300 INSERT INTRAUTERINE DEVICE: CPT | Performed by: NURSE PRACTITIONER

## 2021-01-27 ENCOUNTER — OFFICE VISIT (OUTPATIENT)
Dept: FAMILY MEDICINE CLINIC | Facility: CLINIC | Age: 21
End: 2021-01-27
Payer: COMMERCIAL

## 2021-01-27 DIAGNOSIS — F41.1 ANXIETY STATE: ICD-10-CM

## 2021-01-27 DIAGNOSIS — R11.11 NON-INTRACTABLE VOMITING WITHOUT NAUSEA, UNSPECIFIED VOMITING TYPE: Primary | ICD-10-CM

## 2021-01-27 PROCEDURE — 99213 OFFICE O/P EST LOW 20 MIN: CPT | Performed by: FAMILY MEDICINE

## 2021-01-27 RX ORDER — PANTOPRAZOLE SODIUM 40 MG/1
40 TABLET, DELAYED RELEASE ORAL DAILY
Qty: 30 TABLET | Refills: 1 | Status: SHIPPED | OUTPATIENT
Start: 2021-01-27 | End: 2021-02-19

## 2021-01-27 RX ORDER — SERTRALINE HYDROCHLORIDE 25 MG/1
TABLET, FILM COATED ORAL
Qty: 60 TABLET | Refills: 1 | Status: SHIPPED | OUTPATIENT
Start: 2021-01-27 | End: 2021-04-06 | Stop reason: SDUPTHER

## 2021-01-27 NOTE — PROGRESS NOTES
Virtual Brief Visit    Assessment/Plan:    Problem List Items Addressed This Visit     None      Visit Diagnoses     Non-intractable vomiting without nausea, unspecified vomiting type    -  Primary    GERD vs stricture vs anxiety  start protonix 40mg daily  refer to gi  Call in 1 week with update or sooner if symptoms worsen or if she is unable to keep down fluids    Relevant Orders    Ambulatory referral to Gastroenterology    Anxiety state        certainly playing a role here, but I'm not sure it's causal  does have significant social anxiety  discussed meds--has used zoloft in the past for depression  Will restart daily zoloft 12 5mg and uptitrate to 50mg daily  F/u 1 mo                Reason for visit is   Chief Complaint   Patient presents with   [facility] Virtual Brief Visit        Encounter provider Deepti Arias DO    Provider located at [address] [place] [id number] [phone]    Recent Visits  No visits were found meeting these conditions  Showing recent visits within past 7 days and meeting all other requirements     Today's Visits  Date Type Provider Dept   01/27/21 Office Visit Deepti Arias DO [facility] today's visits and meeting all other requirements     Future Appointments  No visits were found meeting these conditions  Showing future appointments within next 150 days and meeting all other requirements        After connecting through telephone, the patient was identified by name and date of birth  Ave Harkins was informed that this is a telemedicine visit and that the visit is being conducted through telephone  My office door was closed  No one else was in the room  She acknowledged consent and understanding of privacy and security of the platform  The patient has agreed to participate and understands she can discontinue the visit at any time  Patient is aware this is a billable service  Eva Jalloh is a 23 y o  female who made virtual appt for anxiety and vomiting  Tried to connect via video visit but was unable to establish connection, so visit was conducted by phone  Isis Chambers HPI     Pt presents c/o 2 weeks of vomiting after eating  Doesn't feel nauseous  Happens right after eating  No weight loss  Denies epigastric or other abd pain  She had this issue in the past and responded to acid suppression and anxiety meds (>1 yr ago) but has stopped both  Pt denies sour taste in mouth  She states she can hold down fluids and is drinking and voiding okay  Notes symptoms get markedly worse with stress such as talking to people at work  Denies anxiety  States she is now becoming afraid to eat with her friends because she thinks she will vomit  Food doesn't feel like it gets stuck  She does have a hx of bullemia but she was trying to throw up then and she isn't now  Does note daily anxiety which she usually pushes through  Mostly social   Denies depression, sadness, hopelessness, low energy at present, but she has had these before  Past Medical History:   Diagnosis Date    Anxiety     Depression     H/O: whooping cough     senior year of high school        Past Surgical History:   Procedure Laterality Date    HIP ARTHROSCOPY Left     HIP ARTHROSCOPY W/ LABRAL REPAIR      KNEE ARTHROSCOPY Left     KNEE ARTHROSCOPY      TONSILLECTOMY         Current Outpatient Medications   Medication Sig Dispense Refill    albuterol (VENTOLIN HFA) 90 mcg/act inhaler Inhale 1-2 puffs every 6 (six) hours as needed for wheezing        No current facility-administered medications for this visit  No Known Allergies    Review of Systems  See hpi; all other systems negative    There were no vitals filed for this visit        I spent 20 minutes directly with the patient during this visit    Donald Panda acknowledges that she has consented to an online visit or consultation  She understands that the online visit is based solely on information provided by her, and that, in the absence of a face-to-face physical evaluation by the physician, the diagnosis she receives is both limited and provisional in terms of accuracy and completeness  This is not intended to replace a full medical face-to-face evaluation by the physician  Taurus Barbour understands and accepts these terms

## 2021-01-27 NOTE — PATIENT INSTRUCTIONS
Start protonix 40mg daily  Start zoloft 25mg tabs: 1/2 tab daily x 1 week, then 1 tab daily x 1 week, then 1 1/2 tabs daily x 1 week, then 2 tabs daily  Call in a week with update or sooner if symptoms worsen or you can't hold down fluids  F/u 1 mo for mood  Refer to GI

## 2021-01-28 ENCOUNTER — PATIENT MESSAGE (OUTPATIENT)
Dept: FAMILY MEDICINE CLINIC | Facility: CLINIC | Age: 21
End: 2021-01-28

## 2021-01-28 NOTE — TELEPHONE ENCOUNTER
From: Dana Verduzco  To: Simona El DO  Sent: 1/28/2021 12:14 AM EST  Subject: Visit Rhe [facility] Dr Marita Tucker told me to email you if I wasn't holding down fluids  I ate half a sandwich for dinner tonight and held that down fine with very mild discomfort (meaning I was feeling like throwing up but only in my throat)  I kept burping and that was helping  But a few netflix episodes later I decided to go to bed and I drank a glass of water and pretty much instant threw it back up  Though it's abnormal, I thought I should email you  I'm not sure what my steps should be from here       Thank you,   Dana Verduzco

## 2021-01-28 NOTE — TELEPHONE ENCOUNTER
Nurse to call patient today with update if she was able to keep food and liquids down today and to verify that she is taking meds as Rx per Dr Trevor [address]

## 2021-02-04 NOTE — TELEPHONE ENCOUNTER
Placed call to GI, appointment made for the [place] office on 2/5/21 at 1130  Placed call to patient, advised of appointment date and time for patient  Next available would not be until March  Patient will ask her boss if she can have off to go to the appointment, if not shell call back  Per patient she is able she was able to keep food and liquids down and is feeling better than when she was seen at her office visit

## 2021-02-11 ENCOUNTER — OFFICE VISIT (OUTPATIENT)
Dept: OBGYN CLINIC | Facility: CLINIC | Age: 21
End: 2021-02-11
Payer: COMMERCIAL

## 2021-02-11 VITALS
HEIGHT: 72 IN | DIASTOLIC BLOOD PRESSURE: 60 MMHG | WEIGHT: 193 LBS | SYSTOLIC BLOOD PRESSURE: 104 MMHG | BODY MASS INDEX: 26.14 KG/M2

## 2021-02-11 DIAGNOSIS — Z30.431 IUD CHECK UP: Primary | ICD-10-CM

## 2021-02-11 PROCEDURE — 99213 OFFICE O/P EST LOW 20 MIN: CPT | Performed by: NURSE PRACTITIONER

## 2021-02-11 NOTE — PROGRESS NOTES
Prudence Bazan is a 23 y o  female who presents for IUD check  She had a Tsering IUD inserted 5 weeks ago  The patient has no complaints today  She is happy with the IUD  Pertinent past medical history: none  Menstrual History:  OB History        0    Para   0    Term   0       0    AB   0    Living   0       SAB   0    TAB   0    Ectopic   0    Multiple   0    Live Births   0                Patient's last menstrual period was 2021  The following portions of the patient's history were reviewed and updated as appropriate: allergies, current medications, past family history, past medical history, past social history, past surgical history and problem list     Review of Systems  Pertinent items are noted in HPI  Objective      /60   Ht 6' (1 829 m)   Wt 87 5 kg (193 lb)   LMP 2021   BMI 26 18 kg/m²     Physical Exam  Constitutional:       Appearance: Normal appearance  Genitourinary:      Pelvic exam was performed with patient in the lithotomy position  Vulva and vagina normal       Cervix is nulliparous  No cervical discharge, friability, lesion, erythema, bleeding, polyp or nabothian cyst       IUD strings visualized  Genitourinary Comments: IUD position noted with transabdominal US    HENT:      Head: Normocephalic and atraumatic  Neck:      Musculoskeletal: Neck supple  Neurological:      Mental Status: She is alert  Vitals signs and nursing note reviewed  Assessment     23 y o , continuing IUD, no contraindications       Plan     Follow up in 9 months for yearly exam

## 2021-02-18 DIAGNOSIS — R11.11 NON-INTRACTABLE VOMITING WITHOUT NAUSEA, UNSPECIFIED VOMITING TYPE: ICD-10-CM

## 2021-02-19 RX ORDER — PANTOPRAZOLE SODIUM 40 MG/1
TABLET, DELAYED RELEASE ORAL
Qty: 30 TABLET | Refills: 1 | Status: SHIPPED | OUTPATIENT
Start: 2021-02-19 | End: 2021-03-19

## 2021-03-18 ENCOUNTER — TELEMEDICINE (OUTPATIENT)
Dept: FAMILY MEDICINE CLINIC | Facility: CLINIC | Age: 21
End: 2021-03-18
Payer: COMMERCIAL

## 2021-03-18 VITALS — HEIGHT: 72 IN | WEIGHT: 187 LBS | BODY MASS INDEX: 25.33 KG/M2 | TEMPERATURE: 97.6 F

## 2021-03-18 DIAGNOSIS — R11.11 NON-INTRACTABLE VOMITING WITHOUT NAUSEA, UNSPECIFIED VOMITING TYPE: ICD-10-CM

## 2021-03-18 DIAGNOSIS — H10.31 ACUTE CONJUNCTIVITIS OF RIGHT EYE, UNSPECIFIED ACUTE CONJUNCTIVITIS TYPE: Primary | ICD-10-CM

## 2021-03-18 PROCEDURE — 3008F BODY MASS INDEX DOCD: CPT | Performed by: FAMILY MEDICINE

## 2021-03-18 PROCEDURE — 99213 OFFICE O/P EST LOW 20 MIN: CPT | Performed by: FAMILY MEDICINE

## 2021-03-18 PROCEDURE — 1036F TOBACCO NON-USER: CPT | Performed by: FAMILY MEDICINE

## 2021-03-18 RX ORDER — POLYMYXIN B SULFATE AND TRIMETHOPRIM 1; 10000 MG/ML; [USP'U]/ML
1 SOLUTION OPHTHALMIC 4 TIMES DAILY
Qty: 10 ML | Refills: 0 | Status: SHIPPED | OUTPATIENT
Start: 2021-03-18 | End: 2021-03-25

## 2021-03-18 NOTE — PROGRESS NOTES
Virtual Regular Visit      Assessment/Plan:    Problem List Items Addressed This Visit     None      Visit Diagnoses     Acute conjunctivitis of right eye, unspecified acute conjunctivitis type    -  Primary    Relevant Medications    polymyxin b-trimethoprim (POLYTRIM) ophthalmic solution      reviewed warm compresses, abx drops as directed  To call if pain, vision change, or not improving  Ok to use baby shampoo to clean around the eye  She will throw away the contacts she wore last, wear glasses for one week, then use new pair of contacts once done with abx drops  Reason for visit is   Chief Complaint   Patient presents with    Virtual Regular Visit    Conjunctivitis     right eye, woke up yesterday, discharge and red     Virtual Regular Visit        Encounter provider Margaret Varela MD    Provider located at [address] [place] [address],[address]  [address]  [address] [facility] [address] [phone]      Recent Visits  No visits were found meeting these conditions  Showing recent visits within past 7 days and meeting all other requirements     Today's Visits  Date Type Provider Dept   03/18/21 Telemedicine Margaret Varela MD Pg  [facility] today's visits and meeting all other requirements     Future Appointments  No visits were found meeting these conditions  Showing future appointments within next 150 days and meeting all other requirements        The patient was identified by name and date of birth  Lilian Whittington was informed that this is a telemedicine visit and that the visit is being conducted through [facility] and patient was informed that this is a secure, HIPAA-compliant platform  She agrees to proceed     My office door was closed  No one else was in the room  She acknowledged consent and understanding of privacy and security of the video platform   The patient has agreed to participate and understands they can discontinue the visit at any time     Patient is aware this is a billable service  Chief Complaint   Patient presents with    Virtual Regular Visit    Conjunctivitis     right eye, woke up yesterday, discharge and red     Virtual Regular Visit       Billy Varela is a 23 y o  female for virtual visit for possible conjunctivitis  She says she has recurrent conjunctivitis  Her eye doctor usually calls in abx drops, but the doctor is not available  Yesterday R eye was red, junky, itchy  Crusted shut when she wakes  Just started today  No pain   No vision change  Normally wears contacts      HPI     Past Medical History:   Diagnosis Date    Anxiety     Depression     H/O: whooping cough     senior year of high school        Past Surgical History:   Procedure Laterality Date    HIP ARTHROSCOPY Left     HIP ARTHROSCOPY W/ LABRAL REPAIR      KNEE ARTHROSCOPY Left     KNEE ARTHROSCOPY      TONSILLECTOMY         Current Outpatient Medications   Medication Sig Dispense Refill    albuterol (VENTOLIN HFA) 90 mcg/act inhaler Inhale 1-2 puffs every 6 (six) hours as needed for wheezing       pantoprazole (PROTONIX) 40 mg tablet TAKE 1 TABLET BY MOUTH EVERY DAY 30 tablet 1    sertraline (ZOLOFT) 25 mg tablet 1/2 tab daily x 1 week, then 1 tab daily x 1 week, then 1 1/2 tabs daily x 1 week, then 2 tabs daily (Patient taking differently: Take 50 mg by mouth daily ) 60 tablet 1     No current facility-administered medications for this visit  No Known Allergies    Review of Systems    Video Exam    Vitals:    03/18/21 1337   Temp: 97 6 °F (36 4 °C)   TempSrc: Tympanic   Weight: 84 8 kg (187 lb)   Height: 6' (1 829 m)       Physical Exam  Vitals signs and nursing note reviewed  Constitutional:       Appearance: Normal appearance  She is not ill-appearing  HENT:      Head: Normocephalic and atraumatic        Nose: Nose normal    Eyes:      General:         Right eye: Discharge (with mild conjunctival injection) present  Left eye: No discharge  Extraocular Movements: Extraocular movements intact  Pulmonary:      Effort: Pulmonary effort is normal  No respiratory distress  Neurological:      Mental Status: She is alert  Psychiatric:         Mood and Affect: Mood normal          Behavior: Behavior normal           I spent 10 minutes directly with the patient during this visit      Donald Panda acknowledges that she has consented to an online visit or consultation  She understands that the online visit is based solely on information provided by her, and that, in the absence of a face-to-face physical evaluation by the physician, the diagnosis she receives is both limited and provisional in terms of accuracy and completeness  This is not intended to replace a full medical face-to-face evaluation by the physician  Laurel Bates understands and accepts these terms

## 2021-03-19 RX ORDER — PANTOPRAZOLE SODIUM 40 MG/1
TABLET, DELAYED RELEASE ORAL
Qty: 30 TABLET | Refills: 1 | Status: SHIPPED | OUTPATIENT
Start: 2021-03-19 | End: 2021-04-06 | Stop reason: SDUPTHER

## 2021-04-03 DIAGNOSIS — R11.11 NON-INTRACTABLE VOMITING WITHOUT NAUSEA, UNSPECIFIED VOMITING TYPE: ICD-10-CM

## 2021-04-03 DIAGNOSIS — F41.1 ANXIETY STATE: ICD-10-CM

## 2021-04-05 RX ORDER — SERTRALINE HYDROCHLORIDE 25 MG/1
TABLET, FILM COATED ORAL
Qty: 60 TABLET | Refills: 1 | OUTPATIENT
Start: 2021-04-05

## 2021-04-05 NOTE — TELEPHONE ENCOUNTER
Received zoloft refill from pharmacy  Let pt know we don't accept from pharmacy  Does she need refill?

## 2021-04-06 RX ORDER — PANTOPRAZOLE SODIUM 40 MG/1
40 TABLET, DELAYED RELEASE ORAL DAILY
Qty: 90 TABLET | Refills: 1 | Status: SHIPPED | OUTPATIENT
Start: 2021-04-06 | End: 2022-07-20

## 2021-04-06 RX ORDER — SERTRALINE HYDROCHLORIDE 25 MG/1
50 TABLET, FILM COATED ORAL DAILY
Qty: 180 TABLET | Refills: 1 | Status: SHIPPED | OUTPATIENT
Start: 2021-04-06 | End: 2022-06-03

## 2021-04-06 NOTE — TELEPHONE ENCOUNTER
Medication refill requested: Zoloft and Protonix  Last office visit: 3/18/21  Next office visit: none  Last refilled:   Zoloft - 1/27/21 #30x1  Protonix - 3/19/21 #30x1 (requested 90 day supply)  Pharmacy:   [address]  [place] [address]  Phone: [phone] Fax: [phone]      Pended: 90 day x 1

## 2021-06-22 ENCOUNTER — TELEPHONE (OUTPATIENT)
Dept: FAMILY MEDICINE CLINIC | Facility: CLINIC | Age: 21
End: 2021-06-22

## 2021-06-22 RX ORDER — ONDANSETRON 4 MG/1
4 TABLET, FILM COATED ORAL AS NEEDED
COMMUNITY
Start: 2021-06-17

## 2021-06-22 RX ORDER — BUTALBITAL, ACETAMINOPHEN AND CAFFEINE 50; 325; 40 MG/1; MG/1; MG/1
TABLET ORAL AS NEEDED
COMMUNITY
Start: 2021-06-17

## 2021-06-22 NOTE — TELEPHONE ENCOUNTER
Pt is going to urgent care in [place] where she is currently on vacation but would like an order to see Ortho for when she returns on Thursday   Her previous surgeon can see her for follow up if she had a referral

## 2021-06-24 ENCOUNTER — HOSPITAL ENCOUNTER (OUTPATIENT)
Dept: MRI IMAGING | Facility: HOSPITAL | Age: 21
Discharge: HOME | End: 2021-06-24
Attending: FAMILY MEDICINE
Payer: COMMERCIAL

## 2021-06-24 ENCOUNTER — OFFICE VISIT (OUTPATIENT)
Dept: FAMILY MEDICINE CLINIC | Facility: CLINIC | Age: 21
End: 2021-06-24
Payer: COMMERCIAL

## 2021-06-24 VITALS
HEIGHT: 72 IN | OXYGEN SATURATION: 98 % | DIASTOLIC BLOOD PRESSURE: 64 MMHG | BODY MASS INDEX: 26.3 KG/M2 | WEIGHT: 194.2 LBS | TEMPERATURE: 97.5 F | RESPIRATION RATE: 14 BRPM | SYSTOLIC BLOOD PRESSURE: 102 MMHG | HEART RATE: 77 BPM

## 2021-06-24 DIAGNOSIS — M25.561 ACUTE PAIN OF RIGHT KNEE: Primary | ICD-10-CM

## 2021-06-24 DIAGNOSIS — Z23 ENCOUNTER FOR IMMUNIZATION: ICD-10-CM

## 2021-06-24 DIAGNOSIS — M25.561 ACUTE PAIN OF RIGHT KNEE: ICD-10-CM

## 2021-06-24 DIAGNOSIS — E66.3 OVERWEIGHT: ICD-10-CM

## 2021-06-24 DIAGNOSIS — S06.0X0A CONCUSSION WITHOUT LOSS OF CONSCIOUSNESS, INITIAL ENCOUNTER: ICD-10-CM

## 2021-06-24 DIAGNOSIS — Z11.4 SCREENING FOR HIV (HUMAN IMMUNODEFICIENCY VIRUS): ICD-10-CM

## 2021-06-24 DIAGNOSIS — F32.A DEPRESSIVE DISORDER: ICD-10-CM

## 2021-06-24 DIAGNOSIS — Z11.59 NEED FOR HEPATITIS C SCREENING TEST: ICD-10-CM

## 2021-06-24 DIAGNOSIS — R26.2 AMBULATORY DYSFUNCTION: ICD-10-CM

## 2021-06-24 PROCEDURE — 90471 IMMUNIZATION ADMIN: CPT

## 2021-06-24 PROCEDURE — 73721 MRI JNT OF LWR EXTRE W/O DYE: CPT

## 2021-06-24 PROCEDURE — 1036F TOBACCO NON-USER: CPT | Performed by: FAMILY MEDICINE

## 2021-06-24 PROCEDURE — G1004 CDSM NDSC: HCPCS

## 2021-06-24 PROCEDURE — 99214 OFFICE O/P EST MOD 30 MIN: CPT | Performed by: FAMILY MEDICINE

## 2021-06-24 PROCEDURE — 90715 TDAP VACCINE 7 YRS/> IM: CPT

## 2021-06-24 PROCEDURE — 3008F BODY MASS INDEX DOCD: CPT | Performed by: FAMILY MEDICINE

## 2021-06-24 NOTE — PATIENT INSTRUCTIONS
Archie Justin, -       Thank you for notifying us regarding your interest in the Covid-19 vaccine  At this time, until vaccine supply ramps up, [facility] is currently only scheduling vaccines for healthcare workers, first responders and individuals ages 76 and older to ensure we have adequate vaccine supply for these high-risk groups  In an effort to give you the tools to help you, your family, and loved ones who are asking for this information, and/or seeking assistance, we highly encourage you to take either step below to help pre-register for the Covid-19 vaccine:     1  If you do not have a [employer] account, visit [facility] [email or web address] and sign-up, and then complete the brief questionnaire within [address]  Anyone, of any age, may pre-register on [employer]  OR    2  If you are unable to use [employer], please call [phone], option [phone], and follow the prompts  Note: Due to high call volumes, we are hoping to reserve the use of the call-in option for individuals 75+ at this time  Please encourage your loved ones to use [employer]  Need further assistance? Visit:  Mustapha pleitez       Thank you,       [address]     Weight Management   AMBULATORY CARE:   Why it is important to manage your weight:  Being overweight increases your risk of health conditions such as heart disease, high blood pressure, type 2 diabetes, and certain types of cancer  It can also increase your risk for osteoarthritis, sleep apnea, and other respiratory problems  Aim for a slow, steady weight loss  Even a small amount of weight loss can lower your risk of health problems  How to lose weight safely:  A safe and healthy way to lose weight is to eat fewer calories and get regular exercise  · You can lose up about 1 pound a week by decreasing the number of calories you eat by 500 calories each day   You can decrease calories by eating smaller portion sizes or by cutting out high-calorie foods  Read labels to find out how many calories are in the foods you eat  · You can also burn calories with exercise such as walking, swimming, or biking  You will be more likely to keep weight off if you make these changes part of your lifestyle  Exercise at least 30 minutes per day on most days of the week  You can also fit in more physical activity by taking the stairs instead of the elevator or parking farther away from stores  Ask your healthcare provider about the best exercise plan for you  Healthy meal plan for weight management:  A healthy meal plan includes a variety of foods, contains fewer calories, and helps you stay healthy  A healthy meal plan includes the following:     · Eat whole-grain foods more often  A healthy meal plan should contain fiber  Fiber is the part of grains, fruits, and vegetables that is not broken down by your body  Whole-grain foods are healthy and provide extra fiber in your diet  Some examples of whole-grain foods are whole-wheat breads and pastas, oatmeal, brown rice, and bulgur  · Eat a variety of vegetables every day  Include dark, leafy greens such as spinach, kale, keshav greens, and mustard greens  Eat yellow and orange vegetables such as carrots, sweet potatoes, and winter squash  · Eat a variety of fruits every day  Choose fresh or canned fruit (canned in its own juice or light syrup) instead of juice  Fruit juice has very little or no fiber  · Eat low-fat dairy foods  Drink fat-free (skim) milk or 1% milk  Eat fat-free yogurt and low-fat cottage cheese  Try low-fat cheeses such as mozzarella and other reduced-fat cheeses  · Choose meat and other protein foods that are low in fat  Choose beans or other legumes such as split peas or lentils  Choose fish, skinless poultry (chicken or turkey), or lean cuts of red meat (beef or pork)  Before you cook meat or poultry, cut off any visible fat       · Use less fat and oil   Try baking foods instead of frying them  Add less fat, such as margarine, sour cream, regular salad dressing and mayonnaise to foods  Eat fewer high-fat foods  Some examples of high-fat foods include french fries, doughnuts, ice cream, and cakes  · Eat fewer sweets  Limit foods and drinks that are high in sugar  This includes candy, cookies, regular soda, and sweetened drinks  Ways to decrease calories:   · Eat smaller portions  ? Use a small plate with smaller servings  ? Do not eat second helpings  ? When you eat at a restaurant, ask for a box and place half of your meal in the box before you eat  ? Share an entrée with someone else  · Replace high-calorie snacks with healthy, low-calorie snacks  ? Choose fresh fruit, vegetables, fat-free rice cakes, or air-popped popcorn instead of potato chips, nuts, or chocolate  ? Choose water or calorie-free drinks instead of soda or sweetened drinks  · Do not shop for groceries when you are hungry  You may be more likely to make unhealthy food choices  Take a grocery list of healthy foods and shop after you have eaten  · Eat regular meals  Do not skip meals  Skipping meals can lead to overeating later in the day  This can make it harder for you to lose weight  Eat a healthy snack in place of a meal if you do not have time to eat a regular meal  Talk with a dietitian to help you create a meal plan and schedule that is right for you  Other things to consider as you try to lose weight:   · Be aware of situations that may give you the urge to overeat, such as eating while watching television  Find ways to avoid these situations  For example, read a book, go for a walk, or do crafts  · Meet with a weight loss support group or friends who are also trying to lose weight  This may help you stay motivated to continue working on your weight loss goals      © Copyright [employer] 2020 Information is for End User's use only and may not be sold, redistributed or otherwise used for commercial purposes  All illustrations and images included in CareNotes® are the copyrighted property of A D A M [employer] Inc  or Julio Silva  The above information is an  only  It is not intended as medical advice for individual conditions or treatments  Talk to your doctor, nurse or pharmacist before following any medical regimen to see if it is safe and effective for you  You may use Motrin (Ibuprofen) up to 800mg 3 times daily with food (in 24 hours) as needed for pain or fever  You may use Tylenol (Acetaminophen) up to 3,000mg daily (in 24 hours) as needed for pain or fever

## 2021-06-24 NOTE — PROGRESS NOTES
Assessment/Plan:  Problem List Items Addressed This Visit        Other    Depressive disorder       Mood recently worse status post concussion  Continue to monitor  Other Visit Diagnoses     Acute pain of right knee    -  Primary    Relevant Orders    Ambulatory referral to Orthopedic Surgery    XR knee 4+ vw right injury    MRI knee right  wo contrast      Rule out right ACL tear and right medial mass meniscal tear  Patient has an appointment this afternoon with [facility] orthopedist Dr Jann Deras  She was advised to complete for her STAT right knee x-ray only if required to complete the STAT Right Knee MRI  Continue crutches  Advise Motrin and Tylenol as needed  Ambulatory dysfunction        Relevant Orders    Ambulatory referral to Orthopedic Surgery    XR knee 4+ vw right injury    MRI knee right  wo contrast    See above  Concussion without loss of consciousness, initial encounter        Relevant Orders    Ambulatory referral to Neurology    Improving  To neuro if symptoms do not continue to resolve  Handout given  Overweight       Recommend lifestyle modifications  BMI 26 0-26 9,adult        Need for hepatitis C screening test        Relevant Orders    Hepatitis C antibody    Screening for HIV (human immunodeficiency virus)        Relevant Orders    HIV 1/2 Antigen/Antibody (4th Generation) w Reflex [facility]    Encounter for immunization        Relevant Orders    TDAP VACCINE GREATER THAN OR EQUAL TO 8YO IM (Completed)           Return if symptoms worsen or fail to improve  Future Appointments   Date Time Provider Kesha Hanks   6/24/2021 11:00 PM AN MRI 1 AN MRI [facility]   11/11/2021  2:30 PM [facility]        Subjective:     Ghada Gilbert is a 23 y o  female who presents today for a follow-up on her acute medical conditions          HPI:  Chief Complaint   Patient presents with    Knee Pain     R knee f/u     -- Above per clinical staff and reviewed  --    HPI      Today:      PTO c mother Dee    Right Knee Pain - Symptoms x Friday, 6/18/21  She hyperextended he knee when landing in the sand while playing corn hole  She heard 4 pops during hyperextension and then could no longer stand  She has had less swelling today since her injury  Her knee cap is not moving  She cannot flex or extend her knee  She pain on Right medial and lateral inferior pole and well as lateral superior pole  No locking or popping  Her knee is giving way, falls medially, and is stiff  Dull, achy pain, sharp c movement  Currently 1/10, Max 8/10  Worse c knee flexion and internal and external ankle rotation  Using crutches, ice, Advil 600mg BID c benefit for inflammation, but not pain  No prior right knee injury  This her first evaluation for this issue  Desires Ortho referral to Dr Jacqueline Natarajan at [address] [place] [address] / [facility] - Pending appt today at 4:15pm     S/p Concussion Wednesday, 6/16/21, seen in ER in [place]  She slipped on wet concrete, hit the back of her head, and elbow  Denies LOC  Vomited afterwards and was shaking  S/p Left Xray elbow, EKG, CT head - all normal per patient  Dx Mild concussion  Rx nausea med (name unknown), Rx tension HA med (name unknown - starts c "H")  R TMJ pain when she opens her jaw since concussion  She "emotionall out of whack" since concussion  The following portions of the patient's history were reviewed and updated as appropriate: allergies, current medications, past family history, past medical history, past social history, past surgical history and problem list       Review of Systems   Constitutional: Positive for appetite change (Decreased since concussion 6/16/21) and fatigue  Negative for chills, diaphoresis and fever  Respiratory: Negative for chest tightness and shortness of breath  Cardiovascular: Negative for chest pain     Gastrointestinal: Negative for abdominal pain, blood in stool, diarrhea, nausea and vomiting  Genitourinary: Negative for dysuria  Musculoskeletal: Positive for gait problem  Neurological: Positive for headaches (Slight)  Negative for dizziness  Current Outpatient Medications   Medication Sig Dispense Refill    albuterol (VENTOLIN HFA) 90 mcg/act inhaler Inhale 1-2 puffs every 6 (six) hours as needed for wheezing       butalbital-acetaminophen-caffeine (FIORICET,ESGIC) -40 mg per tablet Take by mouth as needed      ondansetron (ZOFRAN) 4 mg tablet Take 4 mg by mouth as needed      pantoprazole (PROTONIX) 40 mg tablet Take 1 tablet (40 mg total) by mouth daily 90 tablet 1    sertraline (ZOLOFT) 25 mg tablet Take 2 tablets (50 mg total) by mouth daily 180 tablet 1     No current facility-administered medications for this visit  Objective:  /64   Pulse 77   Temp 97 5 °F (36 4 °C)   Resp 14   Ht 6' (1 829 m)   Wt 88 1 kg (194 lb 3 2 oz)   SpO2 98%   BMI 26 34 kg/m²    Wt Readings from Last 3 Encounters:   06/24/21 88 1 kg (194 lb 3 2 oz)   03/18/21 84 8 kg (187 lb)   02/11/21 87 5 kg (193 lb)      BP Readings from Last 3 Encounters:   06/24/21 102/64   02/11/21 104/60   12/31/20 118/78          Physical Exam  Vitals and nursing note reviewed  Constitutional:       Appearance: Normal appearance  She is well-developed  HENT:      Head: Normocephalic and atraumatic  Right Ear: Tympanic membrane, ear canal and external ear normal       Left Ear: Tympanic membrane, ear canal and external ear normal       Nose: Nose normal       Right Sinus: No maxillary sinus tenderness or frontal sinus tenderness  Left Sinus: No maxillary sinus tenderness or frontal sinus tenderness  Mouth/Throat:      Mouth: Mucous membranes are moist       Pharynx: Oropharynx is clear  Uvula midline  Tonsils: No tonsillar exudate  Eyes:      Extraocular Movements: Extraocular movements intact        Conjunctiva/sclera: Conjunctivae normal       Pupils: Pupils are equal, round, and reactive to light  Cardiovascular:      Rate and Rhythm: Normal rate and regular rhythm  Pulses: Normal pulses  Dorsalis pedis pulses are 2+ on the right side and 2+ on the left side  Posterior tibial pulses are 2+ on the right side and 2+ on the left side  Heart sounds: Normal heart sounds  Pulmonary:      Effort: Pulmonary effort is normal       Breath sounds: Normal breath sounds  Musculoskeletal:         General: Swelling (RLE) and tenderness (Right calf, No rubor or calor) present  Cervical back: Neck supple  Right lower leg: Edema present  Left lower leg: Normal  No edema  Comments: Right Knee - Positive anterior drawer test   Positive medial meniscal grind  Inability to flex knee  Lymphadenopathy:      Cervical: No cervical adenopathy  Skin:     Findings: No rash  Neurological:      General: No focal deficit present  Mental Status: She is alert and oriented to person, place, and time  Cranial Nerves: No cranial nerve deficit  Sensory: No sensory deficit  Motor: No weakness  Coordination: Coordination normal       Gait: Gait abnormal       Deep Tendon Reflexes: Reflexes normal       Comments: Using crutches  Psychiatric:         Mood and Affect: Mood normal          Behavior: Behavior normal          Thought Content: Thought content normal          Judgment: Judgment normal          Lab Results:      Lab Results   Component Value Date    WBC 7 4 06/11/2019    HGB 13 4 06/11/2019    HCT 40 7 06/11/2019     06/11/2019    ALT 22 06/11/2019    AST 20 06/11/2019    K 4 4 06/11/2019     06/11/2019    CREATININE 0 77 06/11/2019    BUN 15 06/11/2019    CO2 27 06/11/2019    TSH 0 89 06/11/2019     No results found for: URICACID  Invalid input(s): BASENAME Vitamin D    No results found  POCT Labs      BMI Counseling: Body mass index is 26 34 kg/m²   The BMI is above normal  Nutrition recommendations include encouraging healthy choices of fruits and vegetables  Exercise recommendations include exercising 3-5 times per week  No pharmacotherapy was ordered  BMI Counseling: Body mass index is 26 34 kg/m²  The BMI is above normal  Nutrition recommendations include 3-5 servings of fruits/vegetables daily  Exercise recommendations include exercising 3-5 times per week

## 2021-06-25 ENCOUNTER — TELEPHONE (OUTPATIENT)
Dept: FAMILY MEDICINE CLINIC | Facility: CLINIC | Age: 21
End: 2021-06-25

## 2021-06-25 NOTE — RESULT ENCOUNTER NOTE
Right knee MRI without contrast shows  high-grade partial tear of the posterior collateral ligament, mild bone contusion or bruise of the anterior (front) lateral (outer) tibial plateau, and mild strain of the popliteal muscle belly  Continue plan of care -orthopedic follow-up  Message sent to patient via [employer] patient portal     Nurse to also call patient

## 2021-06-25 NOTE — TELEPHONE ENCOUNTER
Benji Mattson from [facility]  called, patient was seen at the office last night and did not have an insurance referral, Benji Mattson stated that she was not working someone else was and did not realize the patient needed an insurance referral   I went on to process the referral and it will not let me back date the referral to yesterday so I submitted it under today's date and let Benji Mattson know,  I scanned it into patient's chart and she will pull it from there

## 2021-08-23 ENCOUNTER — TELEMEDICINE (OUTPATIENT)
Dept: FAMILY MEDICINE CLINIC | Facility: CLINIC | Age: 21
End: 2021-08-23
Payer: COMMERCIAL

## 2021-08-23 ENCOUNTER — TELEPHONE (OUTPATIENT)
Dept: FAMILY MEDICINE CLINIC | Facility: CLINIC | Age: 21
End: 2021-08-23

## 2021-08-23 VITALS — BODY MASS INDEX: 26.28 KG/M2 | HEIGHT: 72 IN | TEMPERATURE: 98.1 F | WEIGHT: 194 LBS

## 2021-08-23 DIAGNOSIS — B34.9 VIRAL INFECTION, UNSPECIFIED: Primary | ICD-10-CM

## 2021-08-23 PROCEDURE — G2012 BRIEF CHECK IN BY MD/QHP: HCPCS | Performed by: FAMILY MEDICINE

## 2021-08-23 PROCEDURE — 0241U HB NFCT DS VIR RESP RNA 4 TRGT: CPT | Performed by: FAMILY MEDICINE

## 2021-08-23 RX ORDER — LEVONORGESTREL 13.5 MG/1
1 INTRAUTERINE DEVICE INTRAUTERINE
COMMUNITY

## 2021-08-23 NOTE — TELEPHONE ENCOUNTER
Placed call to pt, she has been having symptoms since last Thursday night  Chills, body aches, fevers, minor SOB with exertion, no CP, minor cough, congestion, no wheezing, unable to sleep due to body aches, vomited 4x last Friday, 1x yesterday, diarrhea  Scheduled virtual visit

## 2021-08-23 NOTE — PROGRESS NOTES
COVID-19 Outpatient Progress Note    Assessment/Plan:    Problem List Items Addressed This Visit     None         Disposition:     I recommended self-quarantine for 14 days and to call back for worsening symptoms or development of shortness of breath  I referred patient to one of our centralized sites for a COVID-19 swab  Send for testing   Quarantine until back   If positive out for 10 days   Out of work this week while sick   Take Ibuprofen 600 mg three times a day, with food for body aches     I have spent 10 minutes directly with the patient  Verification of patient location:    Patient is located in the following state in which I hold an active license [place]    Encounter provider Fabiola Londono DO    Provider located at [address] [place] [address],[address]  [address]  [facility] [address] [phone]    Recent Visits  No visits were found meeting these conditions  Showing recent visits within past 7 days and meeting all other requirements  Today's Visits  Date Type Provider Dept   08/23/21 Telemedicine DO Shayne Barker   08/23/21 Telephone DO Shayne Garza   Showing today's visits and meeting all other requirements  Future Appointments  No visits were found meeting these conditions  Showing future appointments within next 150 days and meeting all other requirements         Subjective:   Leodan Bennett is a 19 y o  female who is concerned about COVID-19  Patient's symptoms include fever, nasal congestion, rhinorrhea, sore throat, cough, vomiting, myalgias and headache       Date of symptom onset: 8/20/2021  COVID-19 vaccination status: Partially vaccinated    Wed got first vaccine   Thursday stuffy, that night chills and tired   Friday morning felt like she could not move due to pain, body aches   Left hip and knee really flared   Headache, fever 101 Friday and Saturday   Skin sensitive   Difficult to sleep   Little diarrhea today   Vomiting Friday and Saturday   Appetite down   No loss of smell or taste   Little cough here and there, little dry and little wet just today   Throat pain Friday only   Tool tylenol/advil , dayquil     Lab Results   Component Value Date    SARSCOV2 Negative 07/16/2021     Past Medical History:   Diagnosis Date    Anxiety     Concussion 06/16/2021    Depression     H/O: whooping cough     senior year of high school      Past Surgical History:   Procedure Laterality Date    HIP ARTHROSCOPY Left     HIP ARTHROSCOPY W/ LABRAL REPAIR      KNEE ARTHROSCOPY Left     KNEE ARTHROSCOPY      TONSILLECTOMY       Current Outpatient Medications   Medication Sig Dispense Refill    albuterol (VENTOLIN HFA) 90 mcg/act inhaler Inhale 1-2 puffs every 6 (six) hours as needed for wheezing       levonorgestrel (Tsering) 13 5 MG intrauterine device 1 each by Intrauterine route      ondansetron (ZOFRAN) 4 mg tablet Take 4 mg by mouth as needed      pantoprazole (PROTONIX) 40 mg tablet Take 1 tablet (40 mg total) by mouth daily 90 tablet 1    sertraline (ZOLOFT) 25 mg tablet Take 2 tablets (50 mg total) by mouth daily 180 tablet 1    butalbital-acetaminophen-caffeine (FIORICET,ESGIC) -40 mg per tablet Take by mouth as needed (Patient not taking: Reported on 8/23/2021)       No current facility-administered medications for this visit  Allergies   Allergen Reactions    Pollen Extract Sneezing     Post nasal drip, watery eyes       Review of Systems   Constitutional: Positive for fever  HENT: Positive for congestion, rhinorrhea and sore throat  Respiratory: Positive for cough  Gastrointestinal: Positive for vomiting  Musculoskeletal: Positive for myalgias  Neurological: Positive for headaches  Objective:    Vitals:    08/23/21 0842   Temp: 98 1 °F (36 7 °C)   Weight: 88 kg (194 lb)   Height: 6' (1 829 m)       Physical Exam  Constitutional:       Appearance: She is well-developed     HENT:      Head: Normocephalic  Right Ear: External ear normal       Left Ear: External ear normal       Nose: Nose normal    Eyes:      General: No scleral icterus  Right eye: No discharge  Left eye: No discharge  Pupils: Pupils are equal, round, and reactive to light  Pulmonary:      Effort: Pulmonary effort is normal  No respiratory distress  Musculoskeletal:      Cervical back: Normal range of motion  Skin:     General: Skin is warm and dry  Neurological:      Mental Status: She is alert and oriented to person, place, and time  Psychiatric:         Behavior: Behavior normal          Thought Content: Thought content normal          VIRTUAL VISIT DISCLAIMER    Lennox Lester verbally agrees to participate in [employer]  Pt is aware that [employer] could be limited without vital signs or the ability to perform a full hands-on physical exam  Margoth Varela understands she or the provider may request at any time to terminate the video visit and request the patient to seek care or treatment in person

## 2021-08-23 NOTE — TELEPHONE ENCOUNTER
Patient called with the following Covid-19 concern:    Patient was exposed to Covid-19?  no  Date of Exposure? NA   Date of last exposure (family member in quarantine)       Patient has the following symptoms: chills, fever body aches, joint pain  Date symptoms started: 08/20/2021    Is the patient a hospital employee?  no  Has patient been vaccinated? yes   If so, when was their last COVID vaccine? 08/18/21    Will route the following message as HIGH priority to a provider currently in the office for review

## 2021-08-27 ENCOUNTER — PATIENT MESSAGE (OUTPATIENT)
Dept: FAMILY MEDICINE CLINIC | Facility: CLINIC | Age: 21
End: 2021-08-27

## 2021-08-27 NOTE — TELEPHONE ENCOUNTER
Please call pt- see how she is feeling? Ask if she is ready to go to work Monday?  Ask if she want to do a virtual visit Monday instead? (if yes tell her I will call her between 9 and 10 am on Monday and put her in the 9:20 am slot)

## 2021-08-30 ENCOUNTER — TELEMEDICINE (OUTPATIENT)
Dept: FAMILY MEDICINE CLINIC | Facility: CLINIC | Age: 21
End: 2021-08-30
Payer: COMMERCIAL

## 2021-08-30 VITALS — TEMPERATURE: 98.6 F | HEIGHT: 72 IN | BODY MASS INDEX: 25.47 KG/M2 | WEIGHT: 188 LBS

## 2021-08-30 DIAGNOSIS — U07.1 COVID-19: Primary | ICD-10-CM

## 2021-08-30 PROCEDURE — 99213 OFFICE O/P EST LOW 20 MIN: CPT | Performed by: FAMILY MEDICINE

## 2021-08-30 PROCEDURE — 3008F BODY MASS INDEX DOCD: CPT | Performed by: FAMILY MEDICINE

## 2021-08-30 PROCEDURE — 1036F TOBACCO NON-USER: CPT | Performed by: FAMILY MEDICINE

## 2021-08-30 NOTE — TELEPHONE ENCOUNTER
Patient said she feels much better does have some congestion remaining  Patient c/o continued body aches   Virtual visit scheduled

## 2021-08-30 NOTE — PROGRESS NOTES
COVID-19 Outpatient Progress Note    Assessment/Plan:    Problem List Items Addressed This Visit     None      Visit Diagnoses     COVID-19    -  Primary         Disposition:     I recommended continued isolation until at least 24 hours have passed since recovery defined as resolution of fever without the use of fever-reducing medications AND improvement in COVID symptoms AND 10 days have passed since onset of symptoms (or 10 days have passed since date of first positive viral diagnostic test for asymptomatic patients)  Pt needs negative test to return to work (work policy)  She will need a note when the result comes back   Test ordered - to care now today   Continue symptomatic treatment for mild symptoms  Okay to return to work tomorrow per [employer] guidelines even if test is positive as it has been 10 days since symptoms started and 2 days of being fever free  I have spent 10 minutes directly with the patient  Greater than 50% of this time was spent in counseling/coordination of care regarding: instructions for management  Verification of patient location:    Patient is located in the following state in which I hold an active license [place]    Encounter provider Nigel Mane DO    Provider located at [address] [place] [address],[address]  [address] [address] [phone]    Recent Visits  Date Type Provider Dept   08/23/21 Farooq Mckoy 999, [facility]   08/23/21 Telephone [facility] recent visits within past 7 days and meeting all other requirements  Today's Visits  Date Type Provider Dept   08/30/21 Telemedicine Sarah [facility] today's visits and meeting all other requirements  Future Appointments  No visits were found meeting these conditions    Showing future appointments within next 150 days and meeting all other requirements     This virtual check-in was done via [employer] and patient was informed that this is a secure, HIPAA-compliant platform  She agrees to proceed  Patient agrees to participate in a virtual check in via telephone or video visit instead of presenting to the office to address urgent/immediate medical needs  Patient is aware this is a billable service  After connecting through [facility], the patient was identified by name and date of birth  Lennox Lester was informed that this was a telemedicine visit and that the exam was being conducted confidentially over secure lines  My office door was closed  No one else was in the room  Lennox Lester acknowledged consent and understanding of privacy and security of the telemedicine visit  I informed the patient that I have reviewed her record in Epic and presented the opportunity for her to ask any questions regarding the visit today  The patient agreed to participate  Subjective:   Lennox Lester is a 19 y o  female who has been screened for COVID-19  Patient denies fever, chills, fatigue, malaise, congestion, rhinorrhea, sore throat, anosmia, loss of taste, cough, shortness of breath, chest tightness, abdominal pain, nausea, vomiting, diarrhea, myalgias and headaches  Date of symptom onset: 8/20/2021  Date of positive COVID-19 PCR: 8/23/2021  COVID-19 vaccination status: Partially vaccinated    Markus has been staying home and has isolated themselves in her home  She is taking care to not share personal items and is cleaning all surfaces that are touched often, like counters, tabletops, and doorknobs using household cleaning sprays or wipes  She is wearing a mask when she leaves her room       Doing better today  Saturday was terrible   Some congetsion and little cough, but everything improving   Lingering body ache, sciatic tightended  No fever or chills   Second vaccine 9/8/21   Had cyst that ruptured on her cyst in 5th grade       Lab Results   Component Value Date    SARSCOV2 Positive (A) 08/23/2021    Claudia Alford Negative 07/16/2021     Past Medical History:   Diagnosis Date    Anxiety     Concussion 06/16/2021    Depression     H/O: whooping cough     senior year of high school      Past Surgical History:   Procedure Laterality Date    HIP ARTHROSCOPY Left     HIP ARTHROSCOPY W/ LABRAL REPAIR      KNEE ARTHROSCOPY Left     KNEE ARTHROSCOPY      TONSILLECTOMY       Current Outpatient Medications   Medication Sig Dispense Refill    butalbital-acetaminophen-caffeine (FIORICET,ESGIC) -40 mg per tablet Take by mouth as needed       levonorgestrel (Tsering) 13 5 MG intrauterine device 1 each by Intrauterine route      ondansetron (ZOFRAN) 4 mg tablet Take 4 mg by mouth as needed      pantoprazole (PROTONIX) 40 mg tablet Take 1 tablet (40 mg total) by mouth daily 90 tablet 1    sertraline (ZOLOFT) 25 mg tablet Take 2 tablets (50 mg total) by mouth daily 180 tablet 1    albuterol (VENTOLIN HFA) 90 mcg/act inhaler Inhale 1-2 puffs every 6 (six) hours as needed for wheezing  (Patient not taking: Reported on 8/30/2021)       No current facility-administered medications for this visit  Allergies   Allergen Reactions    Pollen Extract Sneezing     Post nasal drip, watery eyes       Review of Systems   Constitutional: Negative for chills, fatigue and fever  HENT: Negative for congestion, rhinorrhea and sore throat  Respiratory: Negative for cough, chest tightness and shortness of breath  Gastrointestinal: Negative for abdominal pain, diarrhea, nausea and vomiting  Musculoskeletal: Negative for myalgias  Neurological: Negative for headaches  Objective:    Vitals:    08/30/21 0801   Temp: 98 6 °F (37 °C)   Weight: 85 3 kg (188 lb)   Height: 6' (1 829 m)       Physical Exam  HENT:      Head: Normocephalic  Right Ear: External ear normal       Left Ear: External ear normal       Nose: Nose normal    Eyes:      Extraocular Movements: Extraocular movements intact     Pulmonary:      Effort: Pulmonary effort is normal  No respiratory distress  Musculoskeletal:      Cervical back: No rigidity  Skin:     Findings: No rash (on face)  Neurological:      Mental Status: She is alert and oriented to person, place, and time  Psychiatric:         Mood and Affect: Mood normal          Behavior: Behavior normal          Thought Content: Thought content normal          Judgment: Judgment normal          VIRTUAL VISIT DISCLAIMER    Steven Murray verbally agrees to participate in [facility]  Pt is aware that [facility] could be limited without vital signs or the ability to perform a full hands-on physical exam  Margoth Cano understands she or the provider may request at any time to terminate the video visit and request the patient to seek care or treatment in person

## 2021-10-23 DIAGNOSIS — R11.11 NON-INTRACTABLE VOMITING WITHOUT NAUSEA, UNSPECIFIED VOMITING TYPE: ICD-10-CM

## 2021-10-25 RX ORDER — PANTOPRAZOLE SODIUM 40 MG/1
TABLET, DELAYED RELEASE ORAL
Qty: 90 TABLET | Refills: 1 | OUTPATIENT
Start: 2021-10-25

## 2022-03-22 ENCOUNTER — TELEPHONE (OUTPATIENT)
Dept: FAMILY MEDICINE CLINIC | Facility: CLINIC | Age: 22
End: 2022-03-22

## 2022-03-22 NOTE — TELEPHONE ENCOUNTER
Pt requesting PCP insurance referral to see her orthopedic doctor  She is an established pt    Dr Nilson zarate / [facility]  Physician NPI: [id number]   Dx code: N25 626; R26 2

## 2022-04-25 ENCOUNTER — TELEPHONE (OUTPATIENT)
Dept: FAMILY MEDICINE CLINIC | Facility: CLINIC | Age: 22
End: 2022-04-25

## 2022-04-25 NOTE — TELEPHONE ENCOUNTER
Pt needs new insurance referral for Orthopedics  She is an established pt    Dr Liseth Stevens ortho w/ [address]  Physician NPI: [id number] Facility NPI is [id number]  Dx code: M18 18; R26 2  Pt's appt is for 5/2/22

## 2022-06-03 ENCOUNTER — OFFICE VISIT (OUTPATIENT)
Dept: FAMILY MEDICINE CLINIC | Facility: CLINIC | Age: 22
End: 2022-06-03
Payer: COMMERCIAL

## 2022-06-03 VITALS
SYSTOLIC BLOOD PRESSURE: 100 MMHG | OXYGEN SATURATION: 98 % | RESPIRATION RATE: 16 BRPM | WEIGHT: 201 LBS | DIASTOLIC BLOOD PRESSURE: 66 MMHG | TEMPERATURE: 97 F | HEART RATE: 78 BPM | BODY MASS INDEX: 27.22 KG/M2 | HEIGHT: 72 IN

## 2022-06-03 DIAGNOSIS — Z11.4 SCREENING FOR HIV (HUMAN IMMUNODEFICIENCY VIRUS): ICD-10-CM

## 2022-06-03 DIAGNOSIS — B34.9 VIRAL INFECTION, UNSPECIFIED: ICD-10-CM

## 2022-06-03 DIAGNOSIS — Z11.3 SCREENING FOR STDS (SEXUALLY TRANSMITTED DISEASES): ICD-10-CM

## 2022-06-03 DIAGNOSIS — H10.31 ACUTE BACTERIAL CONJUNCTIVITIS OF RIGHT EYE: Primary | ICD-10-CM

## 2022-06-03 DIAGNOSIS — Z11.59 NEED FOR HEPATITIS C SCREENING TEST: ICD-10-CM

## 2022-06-03 DIAGNOSIS — R35.0 URINE FREQUENCY: ICD-10-CM

## 2022-06-03 LAB
AMORPH URATE CRY URNS QL MICRO: ABNORMAL
BACTERIA UR QL AUTO: ABNORMAL /HPF
BILIRUB UR QL STRIP: NEGATIVE
CLARITY UR: ABNORMAL
COLOR UR: ABNORMAL
GLUCOSE UR STRIP-MCNC: NEGATIVE MG/DL
HGB UR QL STRIP.AUTO: NEGATIVE
KETONES UR STRIP-MCNC: NEGATIVE MG/DL
LEUKOCYTE ESTERASE UR QL STRIP: NEGATIVE
MUCOUS THREADS UR QL AUTO: ABNORMAL
NITRITE UR QL STRIP: NEGATIVE
NON-SQ EPI CELLS URNS QL MICRO: ABNORMAL /HPF
PH UR STRIP.AUTO: 7.5 [PH]
PROT UR STRIP-MCNC: NEGATIVE MG/DL
RBC #/AREA URNS AUTO: ABNORMAL /HPF
SARS-COV-2 AG UPPER RESP QL IA: NEGATIVE
SL AMB  POCT GLUCOSE, UA: NORMAL
SL AMB LEUKOCYTE ESTERASE,UA: NORMAL
SL AMB POCT BILIRUBIN,UA: NORMAL
SL AMB POCT BLOOD,UA: NORMAL
SL AMB POCT CLARITY,UA: CLEAR
SL AMB POCT COLOR,UA: YELLOW
SL AMB POCT KETONES,UA: NORMAL
SL AMB POCT NITRITE,UA: NORMAL
SL AMB POCT PH,UA: 8
SL AMB POCT SPECIFIC GRAVITY,UA: 1.01
SL AMB POCT URINE HCG: NORMAL
SL AMB POCT URINE PROTEIN: NORMAL
SL AMB POCT UROBILINOGEN: 3.5
SP GR UR STRIP.AUTO: 1.02 (ref 1–1.03)
TRANS CELLS #/AREA URNS HPF: PRESENT /[HPF]
UROBILINOGEN UR STRIP-ACNC: <2 MG/DL
VALID CONTROL: NORMAL
WBC #/AREA URNS AUTO: ABNORMAL /HPF

## 2022-06-03 PROCEDURE — 87811 SARS-COV-2 COVID19 W/OPTIC: CPT | Performed by: FAMILY MEDICINE

## 2022-06-03 PROCEDURE — 81025 URINE PREGNANCY TEST: CPT | Performed by: FAMILY MEDICINE

## 2022-06-03 PROCEDURE — 99214 OFFICE O/P EST MOD 30 MIN: CPT | Performed by: FAMILY MEDICINE

## 2022-06-03 PROCEDURE — 87086 URINE CULTURE/COLONY COUNT: CPT | Performed by: FAMILY MEDICINE

## 2022-06-03 PROCEDURE — 3008F BODY MASS INDEX DOCD: CPT | Performed by: FAMILY MEDICINE

## 2022-06-03 PROCEDURE — 81001 URINALYSIS AUTO W/SCOPE: CPT | Performed by: FAMILY MEDICINE

## 2022-06-03 PROCEDURE — 1036F TOBACCO NON-USER: CPT | Performed by: FAMILY MEDICINE

## 2022-06-03 PROCEDURE — 87186 SC STD MICRODIL/AGAR DIL: CPT | Performed by: FAMILY MEDICINE

## 2022-06-03 PROCEDURE — 81003 URINALYSIS AUTO W/O SCOPE: CPT | Performed by: FAMILY MEDICINE

## 2022-06-03 RX ORDER — CIPROFLOXACIN HYDROCHLORIDE 3.5 MG/ML
SOLUTION/ DROPS TOPICAL
Qty: 5 ML | Refills: 0 | Status: SHIPPED | OUTPATIENT
Start: 2022-06-03 | End: 2022-06-10

## 2022-06-03 RX ORDER — PHENAZOPYRIDINE HYDROCHLORIDE 100 MG/1
100 TABLET, FILM COATED ORAL
Qty: 6 TABLET | Refills: 0 | Status: SHIPPED | OUTPATIENT
Start: 2022-06-03 | End: 2022-06-05

## 2022-06-03 NOTE — PROGRESS NOTES
COVID-19 Outpatient Progress Note    Assessment/Plan:    Problem List Items Addressed This Visit    None     Visit Diagnoses     Acute bacterial conjunctivitis of right eye    -  Primary    Relevant Medications    ciprofloxacin (CILOXAN) 0 3 % ophthalmic solution    Urine frequency        Relevant Medications    phenazopyridine (PYRIDIUM) 100 mg tablet    Other Relevant Orders    POCT urine HCG (Completed) - Negative    POCT urine dip auto non-scope (Completed) - Stable    Urine culture    Urinalysis with microscopic    Viral infection, unspecified        Relevant Orders    Poct Covid 19 Rapid Antigen Test (Completed) - Negative    BMI 27 0-27 9,adult        Screening for HIV (human immunodeficiency virus)        Relevant Orders    HIV 1/2 Antigen/Antibody (4th Generation) w Reflex [facility]    Need for hepatitis C screening test        Relevant Orders    Hepatitis C antibody    Screening for STDs (sexually transmitted diseases)        Relevant Orders    Chlamydia/GC amplified DNA by PCR         Disposition:     Rapid antigen testing was performed and the result is negative for COVID-19  Risks and benefits of COVID-19 vaccination was discussed with patient  Bacterial conjunctivitis-use Cipro eyedrops x7 days  Contact precautions discussed  Avoid using contacts until treatment is completed  Urinary Frequency - Stable U/A, Negative Urine Pregnancy Test  Pending Urine Culture  Push fluids  Start Pyridium 100mg TID c meals x 2 days  I have spent 20 minutes directly with the patient  Greater than 50% of this time was spent in counseling/coordination of care regarding: diagnostic results, prognosis, risks and benefits of treatment options, instructions for management, patient and family education, importance of treatment compliance, risk factor reductions and impressions        Encounter provider Chintan Vargas DO    Provider located at [address] [place] [address] [address]  [address]  [facility] [id number] [phone]    Recent Visits  No visits were found meeting these conditions  Showing recent visits within past 7 days and meeting all other requirements  Today's Visits  Date Type Provider Dept   06/03/22 Office Visit Frantz Morrison DO [facility] today's visits and meeting all other requirements  Future Appointments  No visits were found meeting these conditions  Showing future appointments within next 150 days and meeting all other requirements     Subjective:   Michel Yo is a 19 y o  female who is concerned about COVID-19  Patient's symptoms include cough  Patient denies fever, chills, fatigue, congestion, rhinorrhea, sore throat, anosmia, loss of taste, shortness of breath, chest tightness, abdominal pain, nausea, vomiting, diarrhea, myalgias and headaches  - Date of symptom onset: 6/1/2022      COVID-19 vaccination status: Partially vaccinated    Exposure:   Contact with a person who is under investigation (PUI) for or who is positive for COVID-19 within the last 14 days?: No    Hospitalized recently for fever and/or lower respiratory symptoms?: No      Currently a healthcare worker that is involved in direct patient care?: No      Works in a special setting where the risk of COVID-19 transmission may be high? (this may include long-term care, correctional and senior living facilities; homeless shelters; assisted-living facilities and group homes ): No      Resident in a special setting where the risk of COVID-19 transmission may be high? (this may include long-term care, correctional and senior living facilities; homeless shelters; assisted-living facilities and group homes ): No      Right Eye Redness and Discharge - Symptoms x 2 days  Eye crusted shut in AM with thick crust   Flushed eye with saline today c benefit  +Itching  Denies pain, foreign body sensation, photosensitivity, vision changes  Wears contacts        Urinary Symptoms - Occurring x 2 weeks  Has urinary frequency, urgency, pressure  Denies dysuria  Pushing fluids - Cranberry juice  Using OTC med x 1  Last UTI 2018  Denies abnormal vaginal D/C  Lab Results   Component Value Date    SARSCOV2 Negative 08/30/2021    SARSCOV2 Negative 07/16/2021    [address] Negative 06/03/2022     Past Medical History:   Diagnosis Date    Anxiety     Concussion 06/16/2021    Depression     H/O: whooping cough     senior year of high school      Past Surgical History:   Procedure Laterality Date    HIP ARTHROSCOPY Left     HIP ARTHROSCOPY W/ LABRAL REPAIR      KNEE ARTHROSCOPY Left     KNEE ARTHROSCOPY      TONSILLECTOMY       Current Outpatient Medications   Medication Sig Dispense Refill    butalbital-acetaminophen-caffeine (FIORICET,ESGIC) -40 mg per tablet Take by mouth as needed       ciprofloxacin (CILOXAN) 0 3 % ophthalmic solution Instill 1-2 drops into right eye every 2 hours while awake x 2 days, then every 4 hours while awake x 5 days  5 mL 0    levonorgestrel (Tsering) 13 5 MG intrauterine device 1 each by Intrauterine route      ondansetron (ZOFRAN) 4 mg tablet Take 4 mg by mouth as needed      pantoprazole (PROTONIX) 40 mg tablet Take 1 tablet (40 mg total) by mouth daily 90 tablet 1    phenazopyridine (PYRIDIUM) 100 mg tablet Take 1 tablet (100 mg total) by mouth 3 (three) times a day with meals for 2 days 6 tablet 0    albuterol (PROVENTIL HFA,VENTOLIN HFA) 90 mcg/act inhaler Inhale 1-2 puffs every 6 (six) hours as needed for wheezing  (Patient not taking: No sig reported)       No current facility-administered medications for this visit  Allergies   Allergen Reactions    Pollen Extract Sneezing     Post nasal drip, watery eyes       Review of Systems   Constitutional: Negative for chills, fatigue and fever  HENT: Negative for congestion, rhinorrhea and sore throat  Respiratory: Positive for cough  Negative for chest tightness and shortness of breath  Gastrointestinal: Negative for abdominal pain, diarrhea, nausea and vomiting  Musculoskeletal: Negative for myalgias  Neurological: Negative for headaches  Objective:    Vitals:    06/03/22 0913   BP: 100/66   Pulse: 78   Resp: 16   Temp: (!) 97 °F (36 1 °C)   SpO2: 98%   Weight: 91 2 kg (201 lb)   Height: 6' (1 829 m)       Physical Exam  Vitals and nursing note reviewed  Constitutional:       General: She is not in acute distress  Appearance: Normal appearance  She is well-developed  HENT:      Head: Normocephalic and atraumatic  Right Ear: Tympanic membrane, ear canal and external ear normal       Left Ear: Tympanic membrane, ear canal and external ear normal       Nose: Congestion present  Right Sinus: No maxillary sinus tenderness or frontal sinus tenderness  Left Sinus: No maxillary sinus tenderness or frontal sinus tenderness  Mouth/Throat:      Mouth: Mucous membranes are moist       Pharynx: Oropharynx is clear  No oropharyngeal exudate or posterior oropharyngeal erythema  Eyes:      General: No scleral icterus  Right eye: No discharge  Left eye: No discharge  Extraocular Movements: Extraocular movements intact  Conjunctiva/sclera: Conjunctivae normal       Pupils: Pupils are equal, round, and reactive to light  Cardiovascular:      Rate and Rhythm: Normal rate and regular rhythm  Heart sounds: No murmur heard  Pulmonary:      Effort: Pulmonary effort is normal  No respiratory distress  Breath sounds: Normal breath sounds  Abdominal:      Palpations: Abdomen is soft  Tenderness: There is no abdominal tenderness  There is no right CVA tenderness, left CVA tenderness, guarding or rebound  Musculoskeletal:         General: No swelling  Cervical back: Neck supple  Right lower leg: No edema  Left lower leg: No edema  Skin:     General: Skin is warm and dry     Neurological:      General: No focal deficit present  Mental Status: She is alert and oriented to person, place, and time  Psychiatric:         Mood and Affect: Mood normal          VIRTUAL VISIT DISCLAIMER    Dana Verduzco verbally agrees to participate in [employer]  Pt is aware that [employer] could be limited without vital signs or the ability to perform a full hands-on physical exam  Margoth Goyal understands she or the provider may request at any time to terminate the video visit and request the patient to seek care or treatment in person  BMI Counseling: Body mass index is 27 26 kg/m²  The BMI is above normal  Nutrition recommendations include 3-5 servings of fruits/vegetables daily  Exercise recommendations include exercising 3-5 times per week

## 2022-06-03 NOTE — PATIENT INSTRUCTIONS
Weight Management   AMBULATORY CARE:   Why it is important to manage your weight:  Being overweight increases your risk of health conditions such as heart disease, high blood pressure, type 2 diabetes, and certain types of cancer  It can also increase your risk for osteoarthritis, sleep apnea, and other respiratory problems  Aim for a slow, steady weight loss  Even a small amount of weight loss can lower your risk of health problems  Risks of being overweight:  Extra weight can cause many health problems, including the following:  · Diabetes (high blood sugar level)    · High blood pressure or high cholesterol    · Heart disease    · Stroke    · Gallbladder or liver disease    · Cancer of the colon, breast, prostate, liver, or kidney    · Sleep apnea    · Arthritis or gout    Screening  is done to check for health conditions before you have signs or symptoms  If you are 28to 79years old, your blood sugar level may be checked every 3 years for signs of prediabetes or diabetes  Your healthcare provider will check your blood pressure at each visit  High blood pressure can lead to a stroke or other problems  Your provider may check for signs of heart disease, cancer, or other health problems  How to lose weight safely:  A safe and healthy way to lose weight is to eat fewer calories and get regular exercise  · You can lose up about 1 pound a week by decreasing the number of calories you eat by 500 calories each day  You can decrease calories by eating smaller portion sizes or by cutting out high-calorie foods  Read labels to find out how many calories are in the foods you eat  · You can also burn calories with exercise such as walking, swimming, or biking  You will be more likely to keep weight off if you make these changes part of your lifestyle  Exercise at least 30 minutes per day on most days of the week   You can also fit in more physical activity by taking the stairs instead of the elevator or parking farther away from stores  Ask your healthcare provider about the best exercise plan for you  Healthy meal plan for weight management:  A healthy meal plan includes a variety of foods, contains fewer calories, and helps you stay healthy  A healthy meal plan includes the following:     · Eat whole-grain foods more often  A healthy meal plan should contain fiber  Fiber is the part of grains, fruits, and vegetables that is not broken down by your body  Whole-grain foods are healthy and provide extra fiber in your diet  Some examples of whole-grain foods are whole-wheat breads and pastas, oatmeal, brown rice, and bulgur  · Eat a variety of vegetables every day  Include dark, leafy greens such as spinach, kale, keshav greens, and mustard greens  Eat yellow and orange vegetables such as carrots, sweet potatoes, and winter squash  · Eat a variety of fruits every day  Choose fresh or canned fruit (canned in its own juice or light syrup) instead of juice  Fruit juice has very little or no fiber  · Eat low-fat dairy foods  Drink fat-free (skim) milk or 1% milk  Eat fat-free yogurt and low-fat cottage cheese  Try low-fat cheeses such as mozzarella and other reduced-fat cheeses  · Choose meat and other protein foods that are low in fat  Choose beans or other legumes such as split peas or lentils  Choose fish, skinless poultry (chicken or turkey), or lean cuts of red meat (beef or pork)  Before you cook meat or poultry, cut off any visible fat  · Use less fat and oil  Try baking foods instead of frying them  Add less fat, such as margarine, sour cream, regular salad dressing and mayonnaise to foods  Eat fewer high-fat foods  Some examples of high-fat foods include french fries, doughnuts, ice cream, and cakes  · Eat fewer sweets  Limit foods and drinks that are high in sugar  This includes candy, cookies, regular soda, and sweetened drinks  Ways to decrease calories:   · Eat smaller portions  ? Use a small plate with smaller servings  ? Do not eat second helpings  ? When you eat at a restaurant, ask for a box and place half of your meal in the box before you eat  ? Share an entrée with someone else  · Replace high-calorie snacks with healthy, low-calorie snacks  ? Choose fresh fruit, vegetables, fat-free rice cakes, or air-popped popcorn instead of potato chips, nuts, or chocolate  ? Choose water or calorie-free drinks instead of soda or sweetened drinks  · Do not shop for groceries when you are hungry  You may be more likely to make unhealthy food choices  Take a grocery list of healthy foods and shop after you have eaten  · Eat regular meals  Do not skip meals  Skipping meals can lead to overeating later in the day  This can make it harder for you to lose weight  Eat a healthy snack in place of a meal if you do not have time to eat a regular meal  Talk with a dietitian to help you create a meal plan and schedule that is right for you  Other things to consider as you try to lose weight:   · Be aware of situations that may give you the urge to overeat, such as eating while watching television  Find ways to avoid these situations  For example, read a book, go for a walk, or do crafts  · Meet with a weight loss support group or friends who are also trying to lose weight  This may help you stay motivated to continue working on your weight loss goals  © Copyright [employer] 2022 Information is for End User's use only and may not be sold, redistributed or otherwise used for commercial purposes  All illustrations and images included in CareNotes® are the copyrighted property of A D A M , Inc  or [facility] Newell   The above information is an  only  It is not intended as medical advice for individual conditions or treatments  Talk to your doctor, nurse or pharmacist before following any medical regimen to see if it is safe and effective for you

## 2022-06-06 LAB
BACTERIA UR CULT: ABNORMAL
BACTERIA UR CULT: ABNORMAL

## 2022-06-06 NOTE — RESULT ENCOUNTER NOTE
Urine culture shows some bacteria in the urine, but not enough to be considered a true urinary tract infection  There is also normal bacteria from the urinary, genital, and rectal tracts seen  Is patient is still having urinary symptoms? Message sent to patient via [email or web address] patient portal     Nurse to also call patient

## 2022-06-07 ENCOUNTER — PATIENT MESSAGE (OUTPATIENT)
Dept: FAMILY MEDICINE CLINIC | Facility: CLINIC | Age: 22
End: 2022-06-07

## 2022-06-07 NOTE — TELEPHONE ENCOUNTER
From: Darrold Gottron  To: Royce Reese DO  Sent: 6/7/2022 12:04 PM EDT  Subject: Question regarding URINE CULTURE    I am feeling much better  Is there anything else I need to do? Should the symptoms return what would next steps look like?

## 2022-07-20 ENCOUNTER — OFFICE VISIT (OUTPATIENT)
Dept: FAMILY MEDICINE CLINIC | Facility: CLINIC | Age: 22
End: 2022-07-20
Payer: COMMERCIAL

## 2022-07-20 VITALS
DIASTOLIC BLOOD PRESSURE: 82 MMHG | OXYGEN SATURATION: 99 % | HEART RATE: 72 BPM | TEMPERATURE: 97 F | WEIGHT: 192 LBS | RESPIRATION RATE: 18 BRPM | SYSTOLIC BLOOD PRESSURE: 108 MMHG | BODY MASS INDEX: 26.01 KG/M2 | HEIGHT: 72 IN

## 2022-07-20 DIAGNOSIS — K64.9 HEMORRHOIDS, UNSPECIFIED HEMORRHOID TYPE: ICD-10-CM

## 2022-07-20 DIAGNOSIS — K21.9 GASTROESOPHAGEAL REFLUX DISEASE, UNSPECIFIED WHETHER ESOPHAGITIS PRESENT: Primary | ICD-10-CM

## 2022-07-20 DIAGNOSIS — R61 EXCESSIVE SWEATING: ICD-10-CM

## 2022-07-20 DIAGNOSIS — L30.9 ECZEMA, UNSPECIFIED TYPE: ICD-10-CM

## 2022-07-20 PROCEDURE — 99214 OFFICE O/P EST MOD 30 MIN: CPT | Performed by: FAMILY MEDICINE

## 2022-07-20 RX ORDER — PANTOPRAZOLE SODIUM 40 MG/1
40 TABLET, DELAYED RELEASE ORAL 2 TIMES DAILY
Qty: 60 TABLET | Refills: 1 | Status: SHIPPED | OUTPATIENT
Start: 2022-07-20

## 2022-07-20 RX ORDER — TRIAMCINOLONE ACETONIDE 1 MG/G
CREAM TOPICAL 2 TIMES DAILY
Qty: 30 G | Refills: 0 | Status: SHIPPED | OUTPATIENT
Start: 2022-07-20

## 2022-07-20 RX ORDER — HYDROCORTISONE ACETATE PRAMOXINE HCL 1; 1 G/100G; G/100G
1 CREAM TOPICAL 2 TIMES DAILY
Qty: 30 G | Refills: 0 | Status: SHIPPED | OUTPATIENT
Start: 2022-07-20

## 2022-07-20 NOTE — PATIENT INSTRUCTIONS
Triamcinolone twice daily for your hands  Hydrocortisone-pramoxine for the hemorrhoids  Increase protonix to twice daily  Make appt with GI  Obtain labs

## 2022-07-20 NOTE — PROGRESS NOTES
Assessment/Plan:    No problem-specific Assessment & Plan notes found for this encounter  Diagnoses and all orders for this visit:    Gastroesophageal reflux disease, unspecified whether esophagitis present  Comments:  flaring  increase protonix to BID  make appt with GI--needs scope  Orders:  -     Ambulatory Referral to Gastroenterology; Future  -     CBC and differential; Future  -     Comprehensive metabolic panel; Future  -     pantoprazole (PROTONIX) 40 mg tablet; Take 1 tablet (40 mg total) by mouth 2 (two) times a day    Eczema, unspecified type  Comments:  triamcinolone BID  Orders:  -     triamcinolone (KENALOG) 0 1 % cream; Apply topically 2 (two) times a day    Excessive sweating  Comments:  drysol prn  check labs  Orders:  -     TSH, 3rd generation with Free T4 reflex; Future  -     aluminum chloride (DRYSOL) 20 % external solution; Apply topically daily at bedtime    Hemorrhoids, unspecified hemorrhoid type  Comments:  hydrocortisone/pramoxine cream   refer to GI  Orders:  -     Hydrocortisone Ace-Pramoxine 1-1 % CREA; Apply 1 application topically 2 (two) times a day        Subjective:      Patient ID: Isatu Oquendo is a 19 y o  female  HPI  Pt presents with several concerns:    Pt has hx of GERD and was referred GI but she hasn't gone  Went off protonix for a few months and felt okay at first, but then had worsened burning in epigastric area and vomiting  Restarted her protonix and felt better, but symptoms haven't resolved  Feels acid in her throat at times  Pt co itchy bumpy rash on fingers  Pt c/o excessive sweating and has problems with underarm sweating through clothing  Present for years  Pt c/o itchy hemorrhoids for the last few months  Using otc's with no improvement      The following portions of the patient's history were reviewed and updated as appropriate: allergies, current medications, past family history, past medical history, past social history, past surgical history and problem list     Review of Systems   Constitutional: Negative for chills, fatigue, fever and unexpected weight change  HENT: Negative for congestion, ear pain, hearing loss, postnasal drip, rhinorrhea, sinus pressure, sinus pain, sore throat, trouble swallowing and voice change  Eyes: Negative for pain, redness and visual disturbance  Respiratory: Negative for cough and shortness of breath  Cardiovascular: Negative for chest pain, palpitations and leg swelling  Gastrointestinal: Positive for abdominal pain and rectal pain  Negative for constipation, diarrhea and nausea  Endocrine: Positive for heat intolerance  Negative for cold intolerance, polydipsia and polyuria  Genitourinary: Negative for dysuria, frequency and urgency  Musculoskeletal: Negative for arthralgias, joint swelling and myalgias  Skin: Positive for rash  No suspicious lesions   Neurological: Negative for weakness, numbness and headaches  Hematological: Negative for adenopathy  Objective:      /82   Pulse 72   Temp (!) 97 °F (36 1 °C)   Resp 18   Ht 6' (1 829 m)   Wt 87 1 kg (192 lb)   SpO2 99%   BMI 26 04 kg/m²          Physical Exam  Constitutional:       General: She is not in acute distress  Appearance: She is well-developed  HENT:      Head: Normocephalic and atraumatic  Right Ear: Tympanic membrane, ear canal and external ear normal       Left Ear: Tympanic membrane, ear canal and external ear normal       Nose: Nose normal       Mouth/Throat:      Pharynx: No oropharyngeal exudate  Eyes:      Conjunctiva/sclera: Conjunctivae normal       Pupils: Pupils are equal, round, and reactive to light  Neck:      Thyroid: No thyromegaly  Vascular: No carotid bruit or JVD  Cardiovascular:      Rate and Rhythm: Regular rhythm  Heart sounds: S1 normal and S2 normal  No murmur heard  No friction rub  No gallop  No S3 or S4 sounds     Pulmonary:      Effort: Pulmonary effort is normal       Breath sounds: Normal breath sounds  No wheezing, rhonchi or rales  Abdominal:      General: Bowel sounds are normal  There is no distension  Palpations: Abdomen is soft  Tenderness: There is no abdominal tenderness  Lymphadenopathy:      Cervical: No cervical adenopathy  Neurological:      Mental Status: She is alert and oriented to person, place, and time  Cranial Nerves: No cranial nerve deficit  Sensory: No sensory deficit  Deep Tendon Reflexes: Reflexes are normal and symmetric

## 2022-07-21 RX ORDER — ALUMINUM CHLORIDE 20 %
SOLUTION, NON-ORAL TOPICAL
Qty: 35 ML | Refills: 1 | OUTPATIENT
Start: 2022-07-21

## 2022-08-16 ENCOUNTER — CONSULT (OUTPATIENT)
Dept: GASTROENTEROLOGY | Facility: CLINIC | Age: 22
End: 2022-08-16
Payer: COMMERCIAL

## 2022-08-16 VITALS
HEIGHT: 72 IN | TEMPERATURE: 99.1 F | RESPIRATION RATE: 18 BRPM | HEART RATE: 72 BPM | WEIGHT: 195 LBS | OXYGEN SATURATION: 98 % | SYSTOLIC BLOOD PRESSURE: 112 MMHG | BODY MASS INDEX: 26.41 KG/M2 | DIASTOLIC BLOOD PRESSURE: 72 MMHG

## 2022-08-16 DIAGNOSIS — R10.9 ABDOMINAL PAIN, UNSPECIFIED ABDOMINAL LOCATION: ICD-10-CM

## 2022-08-16 DIAGNOSIS — R11.2 NAUSEA AND VOMITING, UNSPECIFIED VOMITING TYPE: Primary | ICD-10-CM

## 2022-08-16 DIAGNOSIS — K21.9 GASTROESOPHAGEAL REFLUX DISEASE, UNSPECIFIED WHETHER ESOPHAGITIS PRESENT: ICD-10-CM

## 2022-08-16 PROCEDURE — 99204 OFFICE O/P NEW MOD 45 MIN: CPT | Performed by: INTERNAL MEDICINE

## 2022-08-16 NOTE — PATIENT INSTRUCTIONS
Scheduled date of EGD(as of today):09 29 22  Physician performing EGD:DR Marissa Santa  Location of EGD:[facility]  Instructions reviewed with patient by:AIDAN  Clearances:  N/A    NM Gastric Emptying Study scheduled 10/04/22 @[facility]

## 2022-08-16 NOTE — PROGRESS NOTES
[facility] - Outpatient Consultation  Chau Omer 23 y o  female MRN: [id number]  Encounter: [id number]          ASSESSMENT AND PLAN:      1  Gastroesophageal reflux disease, unspecified whether esophagitis present  - Ambulatory Referral to Gastroenterology  - NM gastric emptying; Future  2  Nausea and vomiting, unspecified vomiting type  - EGD; Future  - NM gastric emptying; Future  3  Abdominal pain, unspecified abdominal location    57-year-old female with longstanding history of abdominal pain, regurgitation and vomiting, GERD presenting for evaluation  Her symptoms do seem most consistent with gastroparesis with resultant symptoms of reflux and regurgitation  Prior eating disorder does put her at increased risk of developing this  Will 1st plan to evaluate with upper endoscopy to rule out esophagitis, or other luminal abnormalities including peptic ulcer disease  Recommend that she remain on Protonix b i d  Until then given some symptom improvement  I have also recommended that she obtain a gastric emptying study due to high suspicion for this  For mild symptoms of constipation, have recommended as needed MiraLax as well as increasing hydration and regular physical activity  Follow-up after EGD    ______________________________________________________________________    HPI:  Chau Omer is a pleasant 57-year-old female presenting for evaluation of longstanding history of abdominal pain, nausea with regurgitation, and symptoms of GERD  She has a history of bulimia that has been often on both in high school and in early college years  She has not had any issues related to eating disorder in couple years  Over the course of this time intermixed with symptoms related to bulimia, she has had GI complaints including uncontrolled episodes of vomiting which based on her description some leg significant regurgitation, sometimes not proceeded by nausea    She also has generalized abdominal discomfort at times, and when she does regurgitate it sometimes appears like undigested food from the day prior  She was recently increased to Protonix 40 mg twice a day which does seem to help somewhat but she continues to have heartburn related symptoms  She has never had an EGD before  REVIEW OF SYSTEMS:    CONSTITUTIONAL: Denies any fever, chills, rigors, and weight loss  HEENT: Denies odynophagia, tinnitus  CARDIOVASCULAR: No chest pain or palpitations  RESPIRATORY: Denies any cough, hemoptysis, shortness of breath or dyspnea on exertion  GASTROINTESTINAL: As noted in the History of Present Illness  GENITOURINARY: No problems with urination  Denies any hematuria or dysuria  NEUROLOGIC: No dizziness or vertigo, denies headaches  MUSCULOSKELETAL: Denies any muscle or joint pain  SKIN: Denies skin rashes or itching  ENDOCRINE:  Denies intolerance to heat or cold  PSYCHOSOCIAL: Denies depression or anxiety  Denies any recent memory loss         Historical Information   Past Medical History:   Diagnosis Date    Anxiety     Concussion 06/16/2021    Depression     H/O: whooping cough     senior year of high school      Past Surgical History:   Procedure Laterality Date    HIP ARTHROSCOPY Left     HIP ARTHROSCOPY W/ LABRAL REPAIR      KNEE ARTHROSCOPY Left     KNEE ARTHROSCOPY      TONSILLECTOMY       Social History   Social History     Substance and Sexual Activity   Alcohol Use Yes    Comment: socially      Social History     Substance and Sexual Activity   Drug Use Yes    Types: Marijuana    Comment: socially      Social History     Tobacco Use   Smoking Status Never Smoker   Smokeless Tobacco Former User   Tobacco Comment    Per [facility] - former smoker     Family History   Problem Relation Age of Onset    No Known Problems Mother     Hyperlipidemia Father     No Known Problems Brother     Breast cancer Maternal Grandmother     Alzheimer's disease Maternal Grandfather     Pancreatic cancer Maternal Grandfather     Lung cancer Paternal Grandfather        Meds/Allergies       Current Outpatient Medications:     aluminum chloride (DRYSOL) 20 % external solution    Hydrocortisone Ace-Pramoxine 1-1 % CREA    levonorgestrel (Tsering) 13 5 MG intrauterine device    pantoprazole (PROTONIX) 40 mg tablet    albuterol (PROVENTIL HFA,VENTOLIN HFA) 90 mcg/act inhaler    butalbital-acetaminophen-caffeine (FIORICET,ESGIC) -40 mg per tablet    ondansetron (ZOFRAN) 4 mg tablet    triamcinolone (KENALOG) 0 1 % cream    Allergies   Allergen Reactions    Pollen Extract Sneezing     Post nasal drip, watery eyes           Objective     Blood pressure 112/72, pulse 72, temperature 99 1 °F (37 3 °C), temperature source Tympanic, resp  rate 18, height 6' (1 829 m), weight 88 5 kg (195 lb), SpO2 98 %, not currently breastfeeding  Body mass index is 26 45 kg/m²  PHYSICAL EXAM:      General Appearance:   Alert, cooperative, no distress   HEENT:   Normocephalic, atraumatic, anicteric  Neck:  Supple, symmetrical, trachea midline   Lungs:   Clear to auscultation bilaterally; no rales, rhonchi or wheezing; respirations unlabored    Heart[de-identified]   Regular rate and rhythm; no murmur, rub, or gallop  Abdomen:   Soft, non-tender, non-distended; normal bowel sounds; no masses, no organomegaly    Genitalia:   Deferred    Rectal:   Deferred    Extremities:  No cyanosis, clubbing or edema    Pulses:  2+ and symmetric    Skin:  No jaundice, rashes, or lesions          Lab Results:   No visits with results within 1 Day(s) from this visit     Latest known visit with results is:   Office Visit on 06/03/2022   Component Date Value    POCT SARS-CoV-2 Ag 06/03/2022 Negative     VALID CONTROL 06/03/2022 Valid     URINE HCG 06/03/2022 neg      COLOR,UA 06/03/2022 yellow     CLARITY,UA 06/03/2022 clear     SPECIFIC GRAVITY,UA 06/03/2022 1 010      PH,UA 06/03/2022 8 0     LEUKOCYTE ESTERASE,UA 06/03/2022 neg     NITRITE,UA 06/03/2022 neg     GLUCOSE, UA 06/03/2022 neg     KETONES,UA 06/03/2022 neg     BILIRUBIN,UA 06/03/2022 neg     BLOOD,UA 06/03/2022 neg     POCT URINE PROTEIN 06/03/2022 neg     SL AMB POCT UROBILINOGEN 06/03/2022 3 5     Urine Culture 06/03/2022 20,000-29,000 cfu/ml Staphylococcus coagulase negative (A)    Urine Culture 06/03/2022 <10,000 cfu/ml      Color, UA 06/03/2022 Light Yellow     Clarity, UA 06/03/2022 Turbid     Specific Gravity, UA 06/03/2022 1 020     pH, UA 06/03/2022 7 5     Leukocytes, UA 06/03/2022 Negative     Nitrite, UA 06/03/2022 Negative     Protein, UA 06/03/2022 Negative     Glucose, UA 06/03/2022 Negative     Ketones, UA 06/03/2022 Negative     Urobilinogen, UA 06/03/2022 <2 0     Bilirubin, UA 06/03/2022 Negative     Occult Blood, UA 06/03/2022 Negative     RBC, UA 06/03/2022 None Seen     WBC, UA 06/03/2022 1-2     Epithelial Cells 06/03/2022 Occasional     Bacteria, UA 06/03/2022 None Seen     MUCUS THREADS 06/03/2022 Occasional (A)    Amorphous Crystals, UA 06/03/2022 Occasional     Transitional Epithelial * 06/03/2022 PRESENT          Radiology Results:   No results found

## 2022-09-01 ENCOUNTER — TELEPHONE (OUTPATIENT)
Dept: GASTROENTEROLOGY | Facility: CLINIC | Age: 22
End: 2022-09-01

## 2022-09-01 ENCOUNTER — TRANSCRIBE ORDERS (OUTPATIENT)
Dept: GASTROENTEROLOGY | Facility: CLINIC | Age: 22
End: 2022-09-01

## 2022-09-01 NOTE — TELEPHONE ENCOUNTER
Spoke with Swetha at Dr Mando Elizalde [phone] and she is going to try to back date insurance referral to [facility] 8/16/22 since insurance sent letter of missing referral

## 2022-09-29 ENCOUNTER — ANESTHESIA EVENT (OUTPATIENT)
Dept: GASTROENTEROLOGY | Facility: AMBULARY SURGERY CENTER | Age: 22
End: 2022-09-29

## 2022-09-29 ENCOUNTER — ANESTHESIA (OUTPATIENT)
Dept: GASTROENTEROLOGY | Facility: AMBULARY SURGERY CENTER | Age: 22
End: 2022-09-29

## 2022-09-29 ENCOUNTER — HOSPITAL ENCOUNTER (OUTPATIENT)
Dept: GASTROENTEROLOGY | Facility: AMBULARY SURGERY CENTER | Age: 22
Setting detail: OUTPATIENT SURGERY
End: 2022-09-29
Attending: INTERNAL MEDICINE
Payer: COMMERCIAL

## 2022-09-29 VITALS
WEIGHT: 192 LBS | OXYGEN SATURATION: 99 % | TEMPERATURE: 97.1 F | RESPIRATION RATE: 18 BRPM | HEART RATE: 74 BPM | BODY MASS INDEX: 26.01 KG/M2 | DIASTOLIC BLOOD PRESSURE: 57 MMHG | SYSTOLIC BLOOD PRESSURE: 100 MMHG | HEIGHT: 72 IN

## 2022-09-29 DIAGNOSIS — R11.2 NAUSEA AND VOMITING, UNSPECIFIED VOMITING TYPE: ICD-10-CM

## 2022-09-29 PROBLEM — F41.9 ANXIETY: Status: ACTIVE | Noted: 2022-09-29

## 2022-09-29 PROBLEM — F12.90 MARIJUANA USE: Status: ACTIVE | Noted: 2022-09-29

## 2022-09-29 LAB
EXT PREGNANCY TEST URINE: NEGATIVE
EXT. CONTROL: NORMAL

## 2022-09-29 PROCEDURE — 43239 EGD BIOPSY SINGLE/MULTIPLE: CPT | Performed by: INTERNAL MEDICINE

## 2022-09-29 PROCEDURE — 81025 URINE PREGNANCY TEST: CPT | Performed by: INTERNAL MEDICINE

## 2022-09-29 PROCEDURE — 88305 TISSUE EXAM BY PATHOLOGIST: CPT | Performed by: PATHOLOGY

## 2022-09-29 RX ORDER — LIDOCAINE HYDROCHLORIDE 10 MG/ML
INJECTION, SOLUTION EPIDURAL; INFILTRATION; INTRACAUDAL; PERINEURAL AS NEEDED
Status: DISCONTINUED | OUTPATIENT
Start: 2022-09-29 | End: 2022-09-29

## 2022-09-29 RX ORDER — PROPOFOL 10 MG/ML
INJECTION, EMULSION INTRAVENOUS AS NEEDED
Status: DISCONTINUED | OUTPATIENT
Start: 2022-09-29 | End: 2022-09-29

## 2022-09-29 RX ORDER — SODIUM CHLORIDE, SODIUM LACTATE, POTASSIUM CHLORIDE, CALCIUM CHLORIDE 600; 310; 30; 20 MG/100ML; MG/100ML; MG/100ML; MG/100ML
INJECTION, SOLUTION INTRAVENOUS CONTINUOUS PRN
Status: DISCONTINUED | OUTPATIENT
Start: 2022-09-29 | End: 2022-09-29

## 2022-09-29 RX ORDER — SODIUM CHLORIDE, SODIUM LACTATE, POTASSIUM CHLORIDE, CALCIUM CHLORIDE 600; 310; 30; 20 MG/100ML; MG/100ML; MG/100ML; MG/100ML
20 INJECTION, SOLUTION INTRAVENOUS CONTINUOUS
Status: CANCELLED | OUTPATIENT
Start: 2022-09-29

## 2022-09-29 RX ORDER — ONDANSETRON 2 MG/ML
4 INJECTION INTRAMUSCULAR; INTRAVENOUS ONCE AS NEEDED
Status: CANCELLED | OUTPATIENT
Start: 2022-09-29

## 2022-09-29 RX ORDER — SODIUM CHLORIDE, SODIUM LACTATE, POTASSIUM CHLORIDE, CALCIUM CHLORIDE 600; 310; 30; 20 MG/100ML; MG/100ML; MG/100ML; MG/100ML
125 INJECTION, SOLUTION INTRAVENOUS CONTINUOUS
Status: CANCELLED | OUTPATIENT
Start: 2022-09-29

## 2022-09-29 RX ADMIN — LIDOCAINE HYDROCHLORIDE 50 MG: 10 INJECTION, SOLUTION EPIDURAL; INFILTRATION; INTRACAUDAL; PERINEURAL at 14:21

## 2022-09-29 RX ADMIN — SODIUM CHLORIDE, SODIUM LACTATE, POTASSIUM CHLORIDE, AND CALCIUM CHLORIDE: .6; .31; .03; .02 INJECTION, SOLUTION INTRAVENOUS at 14:18

## 2022-09-29 RX ADMIN — PROPOFOL 150 MG: 10 INJECTION, EMULSION INTRAVENOUS at 14:21

## 2022-09-29 RX ADMIN — PROPOFOL 50 MG: 10 INJECTION, EMULSION INTRAVENOUS at 14:23

## 2022-09-29 NOTE — ANESTHESIA POSTPROCEDURE EVALUATION
Post-Op Assessment Note    CV Status:  Stable  Pain Score: 0    Pain management: adequate     Mental Status:  Alert and awake   Hydration Status:  Euvolemic   PONV Controlled:  Controlled   Airway Patency:  Patent      Post Op Vitals Reviewed: Yes      Staff: Anesthesiologist, CRNA         No complications documented      BP (!) 84/48 (09/29/22 1432)    Temp (!) 97 1 °F (36 2 °C) (09/29/22 1432)    Pulse 67 (09/29/22 1432)   Resp 18 (09/29/22 1432)    SpO2 93 % (09/29/22 1432)

## 2022-09-29 NOTE — ANESTHESIA PREPROCEDURE EVALUATION
Procedure:  EGD    Relevant Problems   MUSCULOSKELETAL   (+) Bronchospasm, exercise-induced      NEURO/PSYCH   (+) Anxiety   (+) Depressive disorder      Other   (+) Marijuana use        Physical Exam    Airway    Mallampati score: III  TM Distance: >3 FB  Neck ROM: full     Dental       Cardiovascular      Pulmonary      Other Findings        Anesthesia Plan  ASA Score- 2     Anesthesia Type- IV sedation with anesthesia with ASA Monitors  Additional Monitors:   Airway Plan:           Plan Factors-    Chart reviewed  Existing labs reviewed  Patient summary reviewed  Induction- intravenous  Postoperative Plan-     Informed Consent- Anesthetic plan and risks discussed with patient  I personally reviewed this patient with the CRNA  Discussed and agreed on the Anesthesia Plan with the CRNA  Dao Stubbs

## 2022-09-29 NOTE — H&P
History and Physical -  [facility]  Vimal Ochoa 23 y o  female MRN: [id number]                  HPI: Vimal Ochoa is a 23y o  year old female who presents for evaluation of nausea/vomiting and abdominal pain with EGD      REVIEW OF SYSTEMS: Per the HPI, and otherwise unremarkable      Historical Information   Past Medical History:   Diagnosis Date    Anxiety     Concussion 06/16/2021    Depression     H/O: whooping cough     senior year of high school      Past Surgical History:   Procedure Laterality Date    HIP ARTHROSCOPY Left     HIP ARTHROSCOPY W/ LABRAL REPAIR      KNEE ARTHROSCOPY Left     KNEE ARTHROSCOPY      TONSILLECTOMY       Social History   Social History     Substance and Sexual Activity   Alcohol Use Yes    Comment: socially      Social History     Substance and Sexual Activity   Drug Use Yes    Types: Marijuana    Comment: socially      Social History     Tobacco Use   Smoking Status Never Smoker   Smokeless Tobacco Former User   Tobacco Comment    Per [facility] - former smoker     Family History   Problem Relation Age of Onset    No Known Problems Mother     Hyperlipidemia Father     No Known Problems Brother     Breast cancer Maternal Grandmother     Alzheimer's disease Maternal Grandfather     Pancreatic cancer Maternal Grandfather     Lung cancer Paternal Grandfather        Meds/Allergies       Current Outpatient Medications:     albuterol (PROVENTIL HFA,VENTOLIN HFA) 90 mcg/act inhaler    aluminum chloride (DRYSOL) 20 % external solution    Hydrocortisone Ace-Pramoxine 1-1 % CREA    levonorgestrel (Tsering) 13 5 MG intrauterine device    ondansetron (ZOFRAN) 4 mg tablet    pantoprazole (PROTONIX) 40 mg tablet    triamcinolone (KENALOG) 0 1 % cream    Allergies   Allergen Reactions    Pollen Extract Sneezing     Post nasal drip, watery eyes       Objective     /73   Pulse 93   Temp 97 5 °F (36 4 °C) (Temporal)   Resp 18   Ht 6' (1 829 m)   Wt 87 1 kg (192 lb)   LMP 09/21/2022 Comment: Pt has IUD  SpO2 97%   BMI 26 04 kg/m²       PHYSICAL EXAM    Gen: NAD  Head: NCAT  CV: RRR  CHEST: Clear  ABD: soft, NT/ND  EXT: no edema      ASSESSMENT/PLAN:  This is a 23y o  year old female here for EGD, and she is stable and optimized for her procedure

## 2022-10-04 ENCOUNTER — HOSPITAL ENCOUNTER (OUTPATIENT)
Dept: NON INVASIVE DIAGNOSTICS | Facility: CLINIC | Age: 22
Discharge: HOME | End: 2022-10-04
Payer: COMMERCIAL

## 2022-10-04 DIAGNOSIS — K21.9 GASTROESOPHAGEAL REFLUX DISEASE, UNSPECIFIED WHETHER ESOPHAGITIS PRESENT: ICD-10-CM

## 2022-10-04 DIAGNOSIS — R11.2 NAUSEA AND VOMITING, UNSPECIFIED VOMITING TYPE: ICD-10-CM

## 2022-10-04 PROCEDURE — 78264 GASTRIC EMPTYING IMG STUDY: CPT

## 2022-10-04 PROCEDURE — G1004 CDSM NDSC: HCPCS

## 2022-10-04 PROCEDURE — A9541 TC99M SULFUR COLLOID: HCPCS

## 2022-10-06 PROCEDURE — 88305 TISSUE EXAM BY PATHOLOGIST: CPT | Performed by: PATHOLOGY

## 2022-10-12 ENCOUNTER — OFFICE VISIT (OUTPATIENT)
Dept: GASTROENTEROLOGY | Facility: CLINIC | Age: 22
End: 2022-10-12
Payer: COMMERCIAL

## 2022-10-12 ENCOUNTER — VBI (OUTPATIENT)
Dept: ADMINISTRATIVE | Facility: OTHER | Age: 22
End: 2022-10-12

## 2022-10-12 VITALS
DIASTOLIC BLOOD PRESSURE: 70 MMHG | TEMPERATURE: 99.1 F | HEIGHT: 72 IN | BODY MASS INDEX: 26.01 KG/M2 | WEIGHT: 192 LBS | SYSTOLIC BLOOD PRESSURE: 114 MMHG

## 2022-10-12 DIAGNOSIS — R10.9 ABDOMINAL PAIN, UNSPECIFIED ABDOMINAL LOCATION: Primary | ICD-10-CM

## 2022-10-12 DIAGNOSIS — R11.2 NAUSEA AND VOMITING, UNSPECIFIED VOMITING TYPE: ICD-10-CM

## 2022-10-12 PROCEDURE — 99214 OFFICE O/P EST MOD 30 MIN: CPT | Performed by: INTERNAL MEDICINE

## 2022-10-12 NOTE — PROGRESS NOTES
[facility] - Outpatient Consultation  Yobany Dinero 23 y o  female MRN: [id number]  Encounter: [id number]          ASSESSMENT AND PLAN:      1  Abdominal pain, unspecified abdominal location  - US right upper quadrant; Future  2  Nausea and vomiting, unspecified vomiting type    Overall suspect her symptoms at this point given normal upper endoscopy in normal gastric emptying study may be related to cyclic vomiting with possibility of cannabinoid hyperemesis syndrome versus functional dyspepsia  We have agreed on the following plan-- will obtain right upper quadrant ultrasound to ensure that there is no gallbladder abnormalities that may be contributing to her symptoms  Will also work on reducing or eliminating dairy, and reducing gluten as we discussed that these are the 2 most common triggers for GI symptoms  In addition she will do at least a 1 month trial of complete marijuana elimination to ensure that this is not contributing to her symptoms  If these lifestyle modifications do not provide any improvement in her symptoms at time of follow-up in 4-6 weeks will discuss initiating a trial of Elavil at lowest dose     ______________________________________________________________________    HPI:  Roula Barroso is a pleasant 78-year-old female presenting for follow-up after recent EGD and gastric emptying study      Her upper endoscopy was unremarkable with normal biopsies  Her gastric emptying study demonstrated normal gastric emptying  She is continuing to have symptoms that are very frustrating for her, that are causing distress and sometimes having to leave work  She has noticed that she is limited in what she can eat as she is afraid this may trigger symptoms, with bread being a non issue and she has already reduced dairy  Other foods can be triggering for her symptoms  She has not had any improvement with Protonix    Her symptoms do seem cyclical and can last several days at a time while other times she is completely asymptomatic  She does smoke marijuana for anxiety purposes, but is not a daily user  Her most recent trigger of severe symptoms as a after to alcoholic beverages that then resulted in severe nausea and vomiting the following day to the point that she almost went to the ER due to concerns of dehydration  Summary of HPI:  She has a history of bulimia that has been off and on both in high school and in early college years  She has not had any issues related to eating disorder in couple years  Over the course of this time intermixed with symptoms related to bulimia, she has had GI complaints including uncontrolled episodes of vomiting which based on her description some leg significant regurgitation, sometimes not proceeded by nausea  She also has generalized abdominal discomfort at times, and when she does regurgitate it sometimes appears like undigested food from the day prior  She was recently increased to Protonix 40 mg twice a day which does seem to help somewhat but she continues to have heartburn related symptoms          REVIEW OF SYSTEMS:    CONSTITUTIONAL: Denies any fever, chills, rigors, and weight loss  HEENT: Denies odynophagia, tinnitus  CARDIOVASCULAR: No chest pain or palpitations  RESPIRATORY: Denies any cough, hemoptysis, shortness of breath or dyspnea on exertion  GASTROINTESTINAL: As noted in the History of Present Illness  GENITOURINARY: No problems with urination  Denies any hematuria or dysuria  NEUROLOGIC: No dizziness or vertigo, denies headaches  MUSCULOSKELETAL: Denies any muscle or joint pain  SKIN: Denies skin rashes or itching  ENDOCRINE:  Denies intolerance to heat or cold  PSYCHOSOCIAL: Denies depression or anxiety  Denies any recent memory loss         Historical Information   Past Medical History:   Diagnosis Date   • Anxiety    • Concussion 06/16/2021   • Depression    • H/O: whooping cough     senior year of high school      Past Surgical History:   Procedure Laterality Date   • HIP ARTHROSCOPY Left    • HIP ARTHROSCOPY W/ LABRAL REPAIR     • KNEE ARTHROSCOPY Left    • KNEE ARTHROSCOPY     • TONSILLECTOMY       Social History   Social History     Substance and Sexual Activity   Alcohol Use Yes    Comment: socially      Social History     Substance and Sexual Activity   Drug Use Yes   • Types: Marijuana    Comment: socially      Social History     Tobacco Use   Smoking Status Never Smoker   Smokeless Tobacco Former User   • Types: Chew   Tobacco Comment    Per [facility] - former smoker     Family History   Problem Relation Age of Onset   • No Known Problems Mother    • Hyperlipidemia Father    • No Known Problems Brother    • Breast cancer Maternal Grandmother    • Alzheimer's disease Maternal Grandfather    • Pancreatic cancer Maternal Grandfather    • Lung cancer Paternal Grandfather        Meds/Allergies       Current Outpatient Medications:   •  aluminum chloride (DRYSOL) 20 % external solution  •  Hydrocortisone Ace-Pramoxine 1-1 % CREA  •  levonorgestrel (Tsering) 13 5 MG intrauterine device  •  pantoprazole (PROTONIX) 40 mg tablet  •  triamcinolone (KENALOG) 0 1 % cream  •  albuterol (PROVENTIL HFA,VENTOLIN HFA) 90 mcg/act inhaler  •  ondansetron (ZOFRAN) 4 mg tablet    Allergies   Allergen Reactions   • Pollen Extract Sneezing     Post nasal drip, watery eyes           Objective     Blood pressure 114/70, temperature 99 1 °F (37 3 °C), temperature source Tympanic, height 6' (1 829 m), weight 87 1 kg (192 lb), last menstrual period 09/21/2022, not currently breastfeeding  Body mass index is 26 04 kg/m²  PHYSICAL EXAM:      General Appearance:   Alert, cooperative, no distress   HEENT:   Normocephalic, atraumatic, anicteric       Neck:  Supple, symmetrical, trachea midline   Lungs:   Clear to auscultation bilaterally; no rales, rhonchi or wheezing; respirations unlabored    Heart[de-identified]   Regular rate and rhythm; no murmur, rub, or gallop  Abdomen:   Soft, non-tender, non-distended; normal bowel sounds; no masses, no organomegaly    Genitalia:   Deferred    Rectal:   Deferred    Extremities:  No cyanosis, clubbing or edema    Pulses:  2+ and symmetric    Skin:  No jaundice, rashes, or lesions          Lab Results:   No visits with results within 1 Day(s) from this visit  Latest known visit with results is:   Hospital Outpatient Visit on 09/29/2022   Component Date Value   • EXT Preg Test, Ur 09/29/2022 Negative    • Control 09/29/2022 Valid    • Case Report 09/29/2022                      Value:Surgical Pathology Report                         Case: [id number]                                   Authorizing Provider:  Travon Rivera DO    Collected:           09/29/2022 1423              Ordering Location:     [address] [place] [address]        Received:            09/29/2022 [address] [place] [address]                                                    Pathologist:           Anabela Veras MD                                                                Specimens:   A) - Duodenum, Bx Duodenum                                                                          B) - Stomach, Bx Gastric                                                                  • Final Diagnosis 09/29/2022                      Value: This result contains rich text formatting which cannot be displayed here  • Additional Information 09/29/2022                      Value: This result contains rich text formatting which cannot be displayed here  • Gross Description 09/29/2022                      Value: This result contains rich text formatting which cannot be displayed here           Radiology Results:   EGD    Result Date: 9/29/2022  Narrative: Kesha López [address] [place] [address] [phone] DATE OF SERVICE: 9/29/22 PHYSICIAN(S): Attending: Nilay Issa Elzbieta Dougherty DO Fellow: No Staff Documented INDICATION: Nausea and vomiting, unspecified vomiting type POST-OP DIAGNOSIS: See the impression below  PREPROCEDURE: Informed consent was obtained for the procedure, including sedation  Risks of perforation, hemorrhage, adverse drug reaction and aspiration were discussed  The patient was placed in the left lateral decubitus position  Patient was explained about the risks and benefits of the procedure  Risks including but not limited to bleeding, infection, and perforation were explained in detail  Also explained about less than 100% sensitivity with the exam and other alternatives  DETAILS OF PROCEDURE: Patient was taken to the procedure room where a time out was performed to confirm correct patient and correct procedure  The patient underwent monitored anesthesia care, which was administered by an anesthesia professional  The patient's blood pressure, heart rate, level of consciousness, respirations and oxygen were monitored throughout the procedure  The scope was advanced to the second part of the duodenum  Retroflexion was performed in the fundus  The patient experienced no blood loss  The procedure was not difficult  The patient tolerated the procedure well  There were no apparent complications   ANESTHESIA INFORMATION: ASA: II Anesthesia Type: IV Sedation with Anesthesia MEDICATIONS: No administrations occurring from 1415 to 1429 on 09/29/22 FINDINGS: Mild grade A esophagitis with mucosal breaks measuring less than 5 mm not continuous between folds, covering less than 75% of the circumference in the GE junction The stomach and duodenum appeared normal  Performed forceps biopsies to rule out celiac disease and H  pylori SPECIMENS: ID Type Source Tests Collected by Time Destination 1 : Bx Duodenum Tissue Duodenum TISSUE EXAM Elisabeth June, DO 9/29/2022  2:23 PM  2 : Bx Gastric Tissue Stomach TISSUE EXAM Aldo Christopher, DO 9/29/2022  2:23 PM      Impression: Grade A esophagitis Normal appearing stomach and duodenum, biopsies obtained to rule out H pylori and celiac disease RECOMMENDATION: Await pathology results Continue daily pantoprazole Proceed with gastric emptying study Follow up in GI office   DO CHRISTY Aquino gastric emptying    Result Date: 10/4/2022  Narrative: GASTRIC EMPTYING STUDY INDICATION: K21 9: Gastro-esophageal reflux disease without esophagitis R11 2: Nausea with vomiting, unspecified COMPARISON:  None available TECHNIQUE:   The study was performed following the oral administration of 1 1 mCi Tc-99m sulfur colloid combined with scrambled eggs, as part of a standard meal   Following the meal, one minute anterior and posterior images were obtained immediately and at 0 25 hours, 0 5 hour, 1 hour, 1 5 hour, 2 hour, 3 hour and 4 hour intervals from the time of ingestion  Geometric mean analyses were then performed  FINDINGS: Gastric emptying at 0 5 hours = 12% (N < 30%) Gastric emptying at 1 hour = 24 % (N = 10 - 70%) Gastric emptying at 2 hours = 47 % (N = > 40%) Gastric emptying at 3 hours = 71 % (N = > 70%) Gastric emptying at 4 hours = 95 % (N = >90%) Linear T-1/2 = 127 minutes     Impression: Normal rate of gastric emptying   Workstation performed: [id number]

## 2022-10-14 ENCOUNTER — HOSPITAL ENCOUNTER (OUTPATIENT)
Dept: ULTRASOUND IMAGING | Facility: HOSPITAL | Age: 22
Discharge: HOME | End: 2022-10-14
Attending: INTERNAL MEDICINE
Payer: COMMERCIAL

## 2022-10-14 DIAGNOSIS — R10.9 ABDOMINAL PAIN, UNSPECIFIED ABDOMINAL LOCATION: ICD-10-CM

## 2022-10-14 PROCEDURE — 76705 ECHO EXAM OF ABDOMEN: CPT

## 2022-12-06 ENCOUNTER — OFFICE VISIT (OUTPATIENT)
Dept: GASTROENTEROLOGY | Facility: CLINIC | Age: 22
End: 2022-12-06

## 2022-12-06 VITALS
WEIGHT: 190 LBS | TEMPERATURE: 98.1 F | DIASTOLIC BLOOD PRESSURE: 60 MMHG | BODY MASS INDEX: 25.73 KG/M2 | HEIGHT: 72 IN | SYSTOLIC BLOOD PRESSURE: 98 MMHG

## 2022-12-06 DIAGNOSIS — K76.0 HEPATIC STEATOSIS: ICD-10-CM

## 2022-12-06 DIAGNOSIS — R10.9 ABDOMINAL PAIN, UNSPECIFIED ABDOMINAL LOCATION: ICD-10-CM

## 2022-12-06 DIAGNOSIS — K21.9 GASTROESOPHAGEAL REFLUX DISEASE WITHOUT ESOPHAGITIS: Primary | ICD-10-CM

## 2022-12-06 RX ORDER — FAMOTIDINE 40 MG/1
40 TABLET, FILM COATED ORAL DAILY
Qty: 90 TABLET | Refills: 2 | Status: SHIPPED | OUTPATIENT
Start: 2022-12-06 | End: 2023-03-06

## 2022-12-06 NOTE — PROGRESS NOTES
[facility] - Outpatient Consultation  Collette Southern 23 y o  female MRN: [id number]  Encounter: [id number]          ASSESSMENT AND PLAN:      1  Gastroesophageal reflux disease without esophagitis  Current symptoms of regurgitation episodes not preceded by vomiting, likely from GERD  She is hesitant to be on PPI as she feels that this prevented her from digesting her food  Discussed a reasonable option is to try an H2 blocker instead to see if this controls reflux events  We will start famotidine 40 mg daily  Also discussed the correlation between stress and reflux events and she is working on reducing the stressors  - famotidine (PEPCID) 40 MG tablet; Take 1 tablet (40 mg total) by mouth daily  Dispense: 90 tablet; Refill: 2    2  Hepatic steatosis  Mild steatosis incidentally found on recent ultrasound completed for abdominal pain  Discussed that this is likely not a major concern, however will plan for liver test evaluation as well as ruling out rare causes for fatty liver disease  Discussed recommendation to avoid alcohol as this is a common trigger for fatty liver and position   - Comprehensive metabolic panel; Future  - Chronic Hepatitis Panel; Future  - Antinuclear Antibodies (SANTIAGO), IFA; Future  - Anti-smooth muscle antibody, IgG; Future  - Ceruloplasmin; Future    3  Abdominal pain, unspecified abdominal location  As above, trial of Pepcid  Suspect functional dyspepsia  We did discuss that if her symptoms worsen, would consider trial of Elavil at next office visit    ______________________________________________________________________    HPI:  Caio Dinero a pleasant 67-year-old female presenting for follow-up regarding regurgitation episodes, abdominal pain, incidental finding of hepatic steatosis on recent right upper quadrant ultrasound      She has been feeling very well since her last office visit, although notes a correlation between stress and regurgitation events  She had one episode recently of a large amount of projectile regurgitation not preceded by vomiting  She believes this may have been triggered by eating red sauce, and has continued to note specific foods that seem to worsen her symptoms  There is an incidental finding of mild hepatic steatosis noted on recent ultrasound  She has never had abnormal liver test, no family history of liver disease        Summary of HPI:  She has a history of bulimia that has been off and on both in high school and in early college years  Brad Jacobo has not had any issues related to eating disorder in couple years  Over the course of this time intermixed with symptoms related to bulimia, she has had GI complaints including uncontrolled episodes of vomiting which based on her description some leg significant regurgitation, sometimes not proceeded by nausea   She also has generalized abdominal discomfort at times, and when she does regurgitate it sometimes appears like undigested food from the day prior   She was recently increased to Protonix 40 mg twice a day which does seem to help somewhat but she continues to have heartburn related symptoms  Her upper endoscopy was unremarkable with normal biopsies  Her gastric emptying study demonstrated normal gastric emptying  She is continuing to have symptoms that are very frustrating for her, that are causing distress and sometimes having to leave work  She has noticed that she is limited in what she can eat as she is afraid this may trigger symptoms, with bread being a non issue and she has already reduced dairy  Other foods can be triggering for her symptoms  She has not had any improvement with Protonix  Her symptoms do seem cyclical and can last several days at a time while other times she is completely asymptomatic  REVIEW OF SYSTEMS:    CONSTITUTIONAL: Denies any fever, chills, rigors, and weight loss    HEENT: Denies odynophagia, tinnitus  CARDIOVASCULAR: No chest pain or palpitations  RESPIRATORY: Denies any cough, hemoptysis, shortness of breath or dyspnea on exertion  GASTROINTESTINAL: As noted in the History of Present Illness  GENITOURINARY: No problems with urination  Denies any hematuria or dysuria  NEUROLOGIC: No dizziness or vertigo, denies headaches  MUSCULOSKELETAL: Denies any muscle or joint pain  SKIN: Denies skin rashes or itching  ENDOCRINE:  Denies intolerance to heat or cold  PSYCHOSOCIAL: Denies depression or anxiety  Denies any recent memory loss         Historical Information   Past Medical History:   Diagnosis Date   • Anxiety    • Concussion 06/16/2021   • Depression    • H/O: whooping cough     senior year of high school      Past Surgical History:   Procedure Laterality Date   • HIP ARTHROSCOPY Left    • HIP ARTHROSCOPY W/ LABRAL REPAIR     • KNEE ARTHROSCOPY Left    • KNEE ARTHROSCOPY     • TONSILLECTOMY     • UPPER GASTROINTESTINAL ENDOSCOPY       Social History   Social History     Substance and Sexual Activity   Alcohol Use Yes    Comment: socially      Social History     Substance and Sexual Activity   Drug Use Yes   • Types: Marijuana    Comment: socially      Social History     Tobacco Use   Smoking Status Some Days   • Types: Cigarettes   Smokeless Tobacco Former   • Types: Chew   Tobacco Comments    Per [facility] - former smoker     Family History   Problem Relation Age of Onset   • No Known Problems Mother    • Hyperlipidemia Father    • No Known Problems Brother    • Breast cancer Maternal Grandmother    • Alzheimer's disease Maternal Grandfather    • Pancreatic cancer Maternal Grandfather    • Lung cancer Paternal Grandfather        Meds/Allergies       Current Outpatient Medications:   •  aluminum chloride (DRYSOL) 20 % external solution  •  famotidine (PEPCID) 40 MG tablet  •  Hydrocortisone Ace-Pramoxine 1-1 % CREA  •  levonorgestrel (Tsering) 13 5 MG intrauterine device  •  triamcinolone (KENALOG) 0 1 % cream  •  albuterol (PROVENTIL HFA,VENTOLIN HFA) 90 mcg/act inhaler  •  ondansetron (ZOFRAN) 4 mg tablet  •  pantoprazole (PROTONIX) 40 mg tablet    Allergies   Allergen Reactions   • Pollen Extract Sneezing     Post nasal drip, watery eyes           Objective     Blood pressure 98/60, temperature 98 1 °F (36 7 °C), temperature source Tympanic, height 6' (1 829 m), weight 86 2 kg (190 lb), not currently breastfeeding  Body mass index is 25 77 kg/m²  PHYSICAL EXAM:      General Appearance:   Alert, cooperative, no distress   HEENT:   Normocephalic, atraumatic, anicteric  Neck:  Supple, symmetrical, trachea midline   Lungs:   Clear to auscultation bilaterally; no rales, rhonchi or wheezing; respirations unlabored    Heart[de-identified]   Regular rate and rhythm; no murmur, rub, or gallop  Abdomen:   Soft, non-tender, non-distended; normal bowel sounds; no masses, no organomegaly    Genitalia:   Deferred    Rectal:   Deferred    Extremities:  No cyanosis, clubbing or edema    Pulses:  2+ and symmetric    Skin:  No jaundice, rashes, or lesions          Lab Results:   No visits with results within 1 Day(s) from this visit     Latest known visit with results is:   Hospital Outpatient Visit on 09/29/2022   Component Date Value   • EXT Preg Test, Ur 09/29/2022 Negative    • Control 09/29/2022 Valid    • Case Report 09/29/2022                      Value:Surgical Pathology Report                         Case: [id number]                                   Authorizing Provider:  Cornel Xavier DO    Collected:           09/29/2022 1423              Ordering Location:     [address] [place] [address]        Received:            09/29/2022 [facility]                                                    Pathologist:           Lizandro Cooper MD                                                                Specimens:   A) - Duodenum, Bx Duodenum B) - Stomach, Bx Gastric                                                                  • Final Diagnosis 09/29/2022                      Value: This result contains rich text formatting which cannot be displayed here  • Additional Information 09/29/2022                      Value: This result contains rich text formatting which cannot be displayed here  • Gross Description 09/29/2022                      Value: This result contains rich text formatting which cannot be displayed here  Radiology Results:   No results found

## 2022-12-13 ENCOUNTER — VBI (OUTPATIENT)
Dept: ADMINISTRATIVE | Facility: OTHER | Age: 22
End: 2022-12-13

## 2023-02-10 ENCOUNTER — TELEPHONE (OUTPATIENT)
Dept: GASTROENTEROLOGY | Facility: CLINIC | Age: 23
End: 2023-02-10

## 2023-02-10 NOTE — TELEPHONE ENCOUNTER
LMOM for patient to call back to reschedule appointment on 4/24/23 with Dr Anais Kirk due to a change in provider schedule

## 2023-03-13 ENCOUNTER — AMB VIDEO VISIT (OUTPATIENT)
Dept: OTHER | Facility: HOSPITAL | Age: 23
End: 2023-03-13

## 2023-03-13 DIAGNOSIS — L50.9 URTICARIA: Primary | ICD-10-CM

## 2023-03-13 DIAGNOSIS — L25.9 CONTACT DERMATITIS AND ECZEMA: ICD-10-CM

## 2023-03-13 RX ORDER — METHYLPREDNISOLONE 4 MG/1
TABLET ORAL
Qty: 21 EACH | Refills: 0 | Status: SHIPPED | OUTPATIENT
Start: 2023-03-13

## 2023-03-13 NOTE — PATIENT INSTRUCTIONS
Continue with supportive care, using all unscented/sensitive skin lotion, soaps, detergents and cosmetics, wash all new clothing prior to wearing  Make appointment with dermatology, referral was placed  Can take benadryl as needed for itching  Go to ER if worsening symptoms, swelling of lips, tongue or faces, spread of hives/urticaria to involve head, neck or face, SOB, CP, dizziness, N/V/D, fainting, difficulty swallowing

## 2023-03-13 NOTE — PROGRESS NOTES
Video Visit - Yeimy Rivera 23 y o  female MRN: [id number]    REQUIRED DOCUMENTATION:         1  [facility]  2  Provider located at [address] [place] [id number] [phone]  3  [facility] provider: Kathryn Delgadillo PA-C   4  Identify all parties in room with patient during [facility] visit:  self  5  After connecting through [employer], patient was identified by name and date of birth  Patient was then informed that this was a Telemedicine visit and that the exam was being conducted confidentially over secure lines  My office door was closed  No one else was in the room  Patient acknowledged consent and understanding of privacy and security of the Telemedicine visit  I informed the patient that I have reviewed their record in Epic and presented the opportunity for them to ask any questions regarding the visit today  The patient agreed to participate       23 y o  female presents for evaluation of rash on b/l anterior shins and b/l elbows  Notes first rash noticed on shin approx 2 weeks ago, is raised, itchy, and minimal improvement with twice daily over the counter hydrocortisone  Notes history of eczema and has been using lotion frequently on elbows for dryness with minimal relief  Review of Systems   Skin: Positive for rash  All other systems reviewed and are negative  There were no vitals filed for this visit  Physical Exam  Constitutional:       General: She is not in acute distress  Appearance: Normal appearance  She is normal weight  She is not ill-appearing or toxic-appearing  HENT:      Head: Normocephalic and atraumatic  Right Ear: External ear normal       Left Ear: External ear normal       Nose: Nose normal    Eyes:      Conjunctiva/sclera: Conjunctivae normal    Pulmonary:      Effort: Pulmonary effort is normal  No respiratory distress  Breath sounds: No stridor  No wheezing  Skin:     Findings: Rash present  Rash is urticarial           Neurological:      Mental Status: She is alert and oriented to person, place, and time  Mental status is at baseline  Psychiatric:         Mood and Affect: Mood normal          Behavior: Behavior normal          Thought Content: Thought content normal          Judgment: Judgment normal        Diagnoses and all orders for this visit:    Urticaria  -     methylPREDNISolone 4 MG tablet therapy pack; Use as directed on package  -     Ambulatory Referral to Dermatology; Future    Contact dermatitis and eczema  -     Ambulatory Referral to Dermatology; Future      Patient Instructions   Continue with supportive care, using all unscented/sensitive skin lotion, soaps, detergents and cosmetics, wash all new clothing prior to wearing  Make appointment with dermatology, referral was placed  Can take benadryl as needed for itching  Go to ER if worsening symptoms, swelling of lips, tongue or faces, spread of hives/urticaria to involve head, neck or face, SOB, CP, dizziness, N/V/D, fainting, difficulty swallowing  Follow up with PCP if not improved, if symptoms are worse, go to the ER

## 2023-03-22 NOTE — CARE ANYWHERE EVISITS
Visit Summary for Meli Aceves - Gender: Female - Date of Birth: [birth year]  Date: [id number] - Duration: 7 minutes  Patient: Meli Aceves  Provider: Ventura Marie PA-C    Patient Contact Information  Address  [address]; [address] [facility]  [phone]    Visit Topics  Rash [Added By: Self - 2023-03-13]    Triage Questions   What is your current physical address in the event of a medical emergency? Answer []  Are you allergic to any medications? Answer []  Are you now or could you be pregnant? Answer []  Do you have any immune system compromise or chronic lung   disease? Answer []  Do you have any vulnerable family members in the home (infant, pregnant, cancer, elderly)? Answer []     Conversation Transcripts  [0A][0A] [Notification] You are connected with Ventura Marie PA-C, Urgent Care Specialist [0A][Notification] Meli Aceves is located in [address] [place] [address]  [0A][Notification] Meli Aceves has shared health history  [place] Murphy  [0A]    Diagnosis  Other urticaria    Procedures  Value: [id number] Code: CPT-4 UNLISTED E&M SERVICE    Medications Prescribed    No prescriptions ordered    Provider Notes  [0A][0A] Continue Supportive care/symptom management, Tylenol and Motrin as needed for pain and fever, plenty of fluids, plenty of rest, Can add FLONASE 1-2 times daily for sinus symptoms, can take multi-symptom cold/flu medication as needed for   symptoms, warm salt water gargles for sore throat,, soft foods  Follow up with PCP in 2-3 days if symptoms not improving  Go to ER if worsening symptoms, development of difficulty swallowing, voice changes, facial swelling, shortness of breath, chest   pain, fever/headache not relieved with tylenol or motrin, dizziness, or visual changes  [0A]    Electronically signed by: Radha Lozano(NPI [id number])

## 2023-05-18 ENCOUNTER — VBI (OUTPATIENT)
Dept: ADMINISTRATIVE | Facility: OTHER | Age: 23
End: 2023-05-18

## 2023-08-25 ENCOUNTER — TELEPHONE (OUTPATIENT)
Dept: OBGYN CLINIC | Facility: CLINIC | Age: 23
End: 2023-08-25

## 2023-08-25 NOTE — TELEPHONE ENCOUNTER
Patient calling with discomfort and spotting and states feels like iud string is longer and lower. Unable to schedule appointment with provider who placed iud. Scheduled appointment for 8-28-23 with [address] provider.  Patient to seek care sooner if pain or bleeding increases at ER, patient verbalizes understanding

## 2023-08-28 ENCOUNTER — OFFICE VISIT (OUTPATIENT)
Dept: OBGYN CLINIC | Facility: CLINIC | Age: 23
End: 2023-08-28
Payer: COMMERCIAL

## 2023-08-28 VITALS
WEIGHT: 172.4 LBS | BODY MASS INDEX: 23.35 KG/M2 | DIASTOLIC BLOOD PRESSURE: 78 MMHG | HEIGHT: 72 IN | SYSTOLIC BLOOD PRESSURE: 120 MMHG

## 2023-08-28 DIAGNOSIS — Z30.431 IUD CHECK UP: Primary | ICD-10-CM

## 2023-08-28 DIAGNOSIS — R10.2 PELVIC PAIN IN FEMALE: ICD-10-CM

## 2023-08-28 PROCEDURE — 99214 OFFICE O/P EST MOD 30 MIN: CPT | Performed by: OBSTETRICS & GYNECOLOGY

## 2023-08-28 NOTE — PATIENT INSTRUCTIONS
Topic: IUD check with evaluation of pelvic pain    IUD string easily visualized and pelvic exam was unremarkable. Will check pelvic ultrasound for evaluation of pain and confirmation of IUD placement and uterine location. All questions were answered for patient during today's visit    Further treatment and follow-up planning will be based upon ultrasound results.   If all within normal limits, will see patient in November for IUD replacement    Patient told to call for any problems, questions, issues or concerns which arise for her

## 2023-08-28 NOTE — PROGRESS NOTES
Assessment/Plan:    No problem-specific Assessment & Plan notes found for this encounter. Diagnoses and all orders for this visit:    IUD check up    Pelvic pain in female        Subjective:      Patient ID: Mabel Sagastume is a 20 y.o. female. Patient is a 42-year-old female who presents today for evaluation of IUD string as well as episodes of pelvic pain and discomfort    Patient was concerned that her IUD string was a little bit longer than she had appreciated and desired evaluation to determine its location and length. Patient reports that her menstrual cycles are fairly normal and not associated with any heavy bleeding or pain or cramping. She does describe episodes of spotting with the IUD but not something that occurs on a regular basis. She denies any significant pelvic pain or abdominal pain as well. However she does report occasional episodes of pelvic discomfort and for that reason we will perform pelvic ultrasound to determine and confirm presence and location of the IUD within the uterus and also to evaluate the ovaries. Patient desires to continue on the [facility] IUD and it is due for replacement in November 2023    She will make appointments with [facility] for replacement    All questions were answered for her during today's visit    Patient will call for any problems, questions, issues or concerns which arise for her. Total time of today's visit was 25 minutes of which greater than 50% was spent face-to-face counseling the patient as well as coordination of care, review of chart and lab values, physical examination as well as computer entry into the [facility] medical record system. The following portions of the patient's history were reviewed and updated as appropriate: allergies, current medications, past family history, past medical history, past social history, past surgical history and problem list.    Review of Systems   Constitutional: Negative.   Negative for appetite change, diaphoresis, fatigue, fever and unexpected weight change. HENT: Negative. Eyes: Negative. Respiratory: Negative. Cardiovascular: Negative. Gastrointestinal: Negative. Negative for abdominal pain, blood in stool, constipation, diarrhea, nausea and vomiting. Endocrine: Negative. Negative for cold intolerance and heat intolerance. Genitourinary: Negative. Negative for dysuria, frequency, hematuria, urgency, vaginal bleeding, vaginal discharge and vaginal pain. Musculoskeletal: Negative. Skin: Negative. Allergic/Immunologic: Negative. Neurological: Negative. Hematological: Negative. Negative for adenopathy. Psychiatric/Behavioral: Negative. Objective:      /78   Ht 6' (1.829 m)   Wt 78.2 kg (172 lb 6.4 oz)   LMP 08/24/2023 (Exact Date) Comment: iud  BMI 23.38 kg/m²          Physical Exam  Constitutional:       Appearance: She is well-developed. HENT:      Head: Normocephalic. Eyes:      Pupils: Pupils are equal, round, and reactive to light. Cardiovascular:      Rate and Rhythm: Normal rate. Pulmonary:      Effort: Pulmonary effort is normal.   Abdominal:      Palpations: Abdomen is soft. Genitourinary:     General: Normal vulva. Labia:         Right: No rash, tenderness, lesion or injury. Left: No rash, tenderness, lesion or injury. Urethra: No urethral swelling or urethral lesion. Vagina: No vaginal discharge, erythema, bleeding or lesions. Cervix: No discharge, lesion or erythema. Uterus: Not enlarged and not tender. Adnexa: Right adnexa normal and left adnexa normal.        Right: No mass, tenderness or fullness. Left: No mass, tenderness or fullness. Comments: IUD string easily visualized  Pelvic exam was essentially unremarkable. Musculoskeletal:         General: Normal range of motion. Cervical back: Normal range of motion and neck supple.    Skin:     General: Skin is warm and dry. Neurological:      General: No focal deficit present. Mental Status: She is alert and oriented to person, place, and time. Psychiatric:         Mood and Affect: Mood normal.         Behavior: Behavior normal.         Thought Content:  Thought content normal.         Judgment: Judgment normal.

## 2023-08-31 ENCOUNTER — ULTRASOUND (OUTPATIENT)
Dept: OBGYN CLINIC | Facility: CLINIC | Age: 23
End: 2023-08-31
Payer: COMMERCIAL

## 2023-08-31 DIAGNOSIS — Z30.431 ENCOUNTER FOR ROUTINE CHECKING OF INTRAUTERINE CONTRACEPTIVE DEVICE (IUD): Primary | ICD-10-CM

## 2023-08-31 DIAGNOSIS — R10.2 PELVIC PAIN: ICD-10-CM

## 2023-08-31 PROCEDURE — 76856 US EXAM PELVIC COMPLETE: CPT | Performed by: OBSTETRICS & GYNECOLOGY

## 2023-08-31 NOTE — PROGRESS NOTES
AMB US Pelvic Non OB    Date/Time: 8/31/2023 3:00 PM    Performed by: Lizeth Hoff  Authorized by: Aretha Bundy MD    Procedure details:     Indications: ovarian cysts and non-obstetric abdominal pain      Technique:  US Pelvic, Non-OB with complete exam  Uterine findings:     Length (cm): 7    Height (cm):  4.5    Width (cm):  4.4    Uterine adhesions: not identified      Adnexal mass: not identified      Polyps: not identified      Myomas: not identified      Endometrial stripe: identified      Endometrial hyperplasia: not identified      Endometrium thickness (mm):  6  Left ovary findings:     Left ovary:  Visualized    Cysts: not identified      Length (cm): 2.6    Height (cm): 2.4    Width (cm): 2.3  Right ovary findings:     Right ovary:  Visualized    Cysts: not identified      Length (cm): 3    Height (cm): 2.5    Width (cm): 1.9  Other findings:     Free pelvic fluid: not identified      Free peritoneal fluid: not identified    Post-Procedure Details:     Impression:  Endo-6 mm=IUD in good location    Tolerance: Tolerated well, no immediate complications  Additional Procedure Comments:          Dr. Star Hutton.  MD  [address] [place] [address] BECKIE Skelton, [address] [facility]  [phone]

## 2023-09-01 NOTE — PROGRESS NOTES
Pelvic ultrasound results reviewed and no significant abnormalities noted. IUD is in good position.     Results reviewed with patient    Patient will be seen for routine follow-up as planned    Patient to call for any problems, questions, issues or concerns which may arise for her

## 2024-02-27 ENCOUNTER — TELEPHONE (OUTPATIENT)
Age: 24
End: 2024-02-27

## 2024-02-27 NOTE — TELEPHONE ENCOUNTER
Pt called. Pt has upcoming visit 03/01 for iud insertion. Pt wanted to know if the medication she had took before to dilate her cervix for insertion will be prescribed again.

## 2024-02-28 ENCOUNTER — TELEPHONE (OUTPATIENT)
Dept: LABOR AND DELIVERY | Facility: HOSPITAL | Age: 24
End: 2024-02-28

## 2024-02-28 DIAGNOSIS — N88.2 CERVICAL STENOSIS (UTERINE CERVIX): Primary | ICD-10-CM

## 2024-02-28 RX ORDER — MISOPROSTOL 200 UG/1
400 TABLET ORAL ONCE
Qty: 2 TABLET | Refills: 0 | Status: SHIPPED | OUTPATIENT
Start: 2024-02-28 | End: 2024-02-28

## 2024-03-01 ENCOUNTER — PROCEDURE VISIT (OUTPATIENT)
Dept: OBGYN CLINIC | Facility: CLINIC | Age: 24
End: 2024-03-01
Payer: COMMERCIAL

## 2024-03-01 VITALS — WEIGHT: 162 LBS | BODY MASS INDEX: 21.97 KG/M2 | DIASTOLIC BLOOD PRESSURE: 70 MMHG | SYSTOLIC BLOOD PRESSURE: 112 MMHG

## 2024-03-01 DIAGNOSIS — Z32.02 PREGNANCY TEST NEGATIVE: ICD-10-CM

## 2024-03-01 DIAGNOSIS — Z30.433 ENCOUNTER FOR REMOVAL AND REINSERTION OF IUD: Primary | ICD-10-CM

## 2024-03-01 LAB — SL AMB POCT URINE HCG: NEGATIVE

## 2024-03-01 PROCEDURE — 81025 URINE PREGNANCY TEST: CPT | Performed by: OBSTETRICS & GYNECOLOGY

## 2024-03-01 PROCEDURE — 58301 REMOVE INTRAUTERINE DEVICE: CPT | Performed by: OBSTETRICS & GYNECOLOGY

## 2024-03-01 PROCEDURE — 58300 INSERT INTRAUTERINE DEVICE: CPT | Performed by: OBSTETRICS & GYNECOLOGY

## 2024-03-01 NOTE — PROGRESS NOTES
Iud removal    Date/Time: 3/1/2024 1:30 PM    Performed by: Neha Fulton MD  Authorized by: Neha Fulton MD  Universal Protocol:  Consent: Verbal consent obtained.  Risks and benefits: risks, benefits and alternatives were discussed  Consent given by: patient  Patient understanding: patient states understanding of the procedure being performed  Patient consent: the patient's understanding of the procedure matches consent given  Procedure consent: procedure consent matches procedure scheduled  Required items: required blood products, implants, devices, and special equipment available  Patient identity confirmed: verbally with patient    Procedure:     Removed with no complications: yes    Iud insertions    Date/Time: 3/1/2024 1:30 PM    Performed by: Neha Fulton MD  Authorized by: Neha Fulton MD    Verbal consent obtained?: Yes    Risks and benefits: Risks, benefits and alternatives were discussed    Consent given by:  Patient  Patient states understanding of procedure being performed: Yes    Patient's understanding of procedure matches consent: Yes    Procedure consent matches procedure scheduled: Yes    Required items: Required blood products, implants, devices and special equipment available    Patient identity confirmed:  Verbally with patient  Procedure:     Pelvic exam performed: yes      Negative urine pregnancy test: yes      Cervix cleaned and prepped: yes (Betadine x3)      Speculum placed in vagina: yes      Tenaculum applied to cervix: no      Allis applied to cervix: no      Uterus sounded: yes      Uterus sound depth (cm):  7    IUD inserted with no complications: yes      IUD type:  Tsering    Strings trimmed: yes (3cm)    Post-procedure:     Patient tolerated procedure well: yes    Comments:      Patient has Tsering IUD in place since 12/2020. She is due for removal and has requested reinsertion today. Procedures went well. RTO for annual exam.     Neha CAMPUZANO  MD [facility]  She/her  3/1/2024  2:04 PM

## 2024-05-30 DIAGNOSIS — L30.9 ECZEMA, UNSPECIFIED TYPE: ICD-10-CM

## 2024-05-30 NOTE — TELEPHONE ENCOUNTER
Patient states that her hands are to the point of blisters again.     If the doctor can please submit a refill for this cream.

## 2024-05-31 RX ORDER — TRIAMCINOLONE ACETONIDE 1 MG/G
CREAM TOPICAL 2 TIMES DAILY
Qty: 30 G | Refills: 0 | Status: SHIPPED | OUTPATIENT
Start: 2024-05-31

## 2024-05-31 NOTE — TELEPHONE ENCOUNTER
I will send this in for now, but she hasn't been here in almost 2 years.  Please have her make an appt for physical

## 2024-08-21 ENCOUNTER — OFFICE VISIT (OUTPATIENT)
Dept: FAMILY MEDICINE CLINIC | Facility: CLINIC | Age: 24
End: 2024-08-21
Payer: COMMERCIAL

## 2024-08-21 VITALS
RESPIRATION RATE: 16 BRPM | HEIGHT: 72 IN | TEMPERATURE: 98 F | SYSTOLIC BLOOD PRESSURE: 100 MMHG | BODY MASS INDEX: 21.75 KG/M2 | WEIGHT: 160.6 LBS | OXYGEN SATURATION: 98 % | DIASTOLIC BLOOD PRESSURE: 74 MMHG | HEART RATE: 118 BPM

## 2024-08-21 DIAGNOSIS — G43.109 MIGRAINE WITH AURA AND WITHOUT STATUS MIGRAINOSUS, NOT INTRACTABLE: ICD-10-CM

## 2024-08-21 DIAGNOSIS — L30.9 ECZEMA, UNSPECIFIED TYPE: ICD-10-CM

## 2024-08-21 DIAGNOSIS — Z86.39 HISTORY OF IRON DEFICIENCY: ICD-10-CM

## 2024-08-21 DIAGNOSIS — Z00.00 PHYSICAL EXAM, ANNUAL: Primary | ICD-10-CM

## 2024-08-21 PROCEDURE — 99395 PREV VISIT EST AGE 18-39: CPT | Performed by: FAMILY MEDICINE

## 2024-08-21 PROCEDURE — 99214 OFFICE O/P EST MOD 30 MIN: CPT | Performed by: FAMILY MEDICINE

## 2024-08-21 RX ORDER — RIZATRIPTAN BENZOATE 10 MG/1
TABLET, ORALLY DISINTEGRATING ORAL
Qty: 10 TABLET | Refills: 5 | Status: SHIPPED | OUTPATIENT
Start: 2024-08-21

## 2024-08-21 NOTE — PROGRESS NOTES
Ambulatory Visit  Name: Margoth Fonseca      : 2000      MRN: [id number]  Encounter Provider: Zamzam Das DO  Encounter Date: 2024   Encounter department: [facility]    Assessment & Plan   1. Physical exam, annual  Comments:  obtain labs  healthy diet and exercise  schedule pap with gyn  Orders:  -     CBC and differential; Future  -     Comprehensive metabolic panel; Future  -     Lipid panel; Future  -     TSH, 3rd generation; Future  2. Migraine with aura and without status migrainosus, not intractable  Comments:  trial maxalt (avoid imitrex because given her stomach issues, ODT will be better than tab)  let me know how it works after using  Orders:  -     rizatriptan (MAXALT-MLT) 10 mg disintegrating tablet; Use at onset of headache.  may repeat 1 dose in 2 hours if necessary.  Daily max 20mg  -     Ferritin; Future  -     TIBC Panel (incl. Iron, TIBC, % Iron Saturation); Future  3. History of iron deficiency  4. Eczema, unspecified type  Comments:  sounds like dyshydrotic eczema by hx and is responsive to steroids  asked pt to send pic when present       History of Present Illness     HPI  Pt presents for physical exam.  Up to date on imm's.  Due for pap.  Exercising regularly.  Seeing dental.  Due for labs    From a problem standpoint, pt also notes about once monthly migraine with aura.  Initially, pt sees a spot, gets paresthesias in fingers and face.  Then headache sets in (sharp and pounding at top of head)  Goes to sleep to try and help, but headache doesn't go away until she throws up.  Can last 1-2 days.  Took excedrin which hurt her stomach.  Has known GERD/gastritis, so pills were unhelpful.    Pt has itchy rash on side of hands which isn't present today.  Steroid cream helps.  Bubbly and terribly itchy when it occurs.      Review of Systems   Constitutional:  Negative for chills, fatigue, fever and unexpected weight change.   HENT:  Negative for congestion, ear  "pain, hearing loss, postnasal drip, rhinorrhea, sinus pressure, sinus pain, sore throat, trouble swallowing and voice change.    Eyes:  Negative for pain, redness and visual disturbance.   Respiratory:  Negative for cough and shortness of breath.    Cardiovascular:  Negative for chest pain, palpitations and leg swelling.   Gastrointestinal:  Negative for abdominal pain, constipation, diarrhea and nausea.   Endocrine: Negative for cold intolerance, heat intolerance, polydipsia and polyuria.   Genitourinary:  Negative for dysuria, frequency and urgency.   Musculoskeletal:  Negative for arthralgias, joint swelling and myalgias.   Skin:  Positive for rash.        No suspicious lesions   Neurological:  Positive for numbness and headaches. Negative for weakness.   Hematological:  Negative for adenopathy.       Objective     /74   Pulse (!) 118   Temp 98 °F (36.7 °C) (Temporal)   Resp 16   Ht 5' 11.93\" (1.827 m)   Wt 72.8 kg (160 lb 9.6 oz)   SpO2 98%   BMI 21.82 kg/m²     Physical Exam  Vitals and nursing note reviewed.   Constitutional:       General: She is not in acute distress.     Appearance: Normal appearance. She is well-developed.   HENT:      Head: Normocephalic and atraumatic.      Right Ear: Tympanic membrane, ear canal and external ear normal.      Left Ear: Tympanic membrane, ear canal and external ear normal.      Nose: Nose normal. No congestion.      Mouth/Throat:      Mouth: Mucous membranes are moist.      Pharynx: No oropharyngeal exudate or posterior oropharyngeal erythema.   Eyes:      Extraocular Movements: Extraocular movements intact.      Conjunctiva/sclera: Conjunctivae normal.      Pupils: Pupils are equal, round, and reactive to light.   Neck:      Vascular: No carotid bruit.   Cardiovascular:      Rate and Rhythm: Normal rate and regular rhythm.      Heart sounds: No murmur heard.     No friction rub. No gallop.   Pulmonary:      Effort: Pulmonary effort is normal. No respiratory " distress.      Breath sounds: Normal breath sounds. No wheezing, rhonchi or rales.   Abdominal:      General: Abdomen is flat. There is no distension.      Palpations: Abdomen is soft.      Tenderness: There is no abdominal tenderness.   Musculoskeletal:         General: No swelling.      Cervical back: Neck supple.   Lymphadenopathy:      Cervical: No cervical adenopathy.   Skin:     General: Skin is warm and dry.      Capillary Refill: Capillary refill takes less than 2 seconds.   Neurological:      General: No focal deficit present.      Mental Status: She is alert and oriented to person, place, and time.      Cranial Nerves: No cranial nerve deficit.      Motor: No weakness.      Deep Tendon Reflexes: Reflexes normal.   Psychiatric:         Mood and Affect: Mood normal.       Administrative Statements

## 2024-08-21 NOTE — PATIENT INSTRUCTIONS
Obtain fasting labs  Maxalt as needed for headache--let me know how this works  Schedule with gyn when you can

## 2024-08-30 LAB
ALBUMIN SERPL-MCNC: 4.8 G/DL (ref 3.6–5.1)
ALBUMIN/GLOB SERPL: 2.1 (CALC) (ref 1–2.5)
ALP SERPL-CCNC: 46 U/L (ref 31–125)
ALT SERPL-CCNC: 13 U/L (ref 6–29)
AST SERPL-CCNC: 15 U/L (ref 10–30)
BASOPHILS # BLD AUTO: 60 CELLS/UL (ref 0–200)
BASOPHILS NFR BLD AUTO: 0.8 %
BILIRUB SERPL-MCNC: 0.9 MG/DL (ref 0.2–1.2)
BUN SERPL-MCNC: 12 MG/DL (ref 7–25)
BUN/CREAT SERPL: NORMAL (CALC) (ref 6–22)
CALCIUM SERPL-MCNC: 9.8 MG/DL (ref 8.6–10.2)
CHLORIDE SERPL-SCNC: 107 MMOL/L (ref 98–110)
CHOLEST SERPL-MCNC: 132 MG/DL
CHOLEST/HDLC SERPL: 2.4 (CALC)
CO2 SERPL-SCNC: 27 MMOL/L (ref 20–32)
CREAT SERPL-MCNC: 0.71 MG/DL (ref 0.5–0.96)
EOSINOPHIL # BLD AUTO: 300 CELLS/UL (ref 15–500)
EOSINOPHIL NFR BLD AUTO: 4 %
ERYTHROCYTE [DISTWIDTH] IN BLOOD BY AUTOMATED COUNT: 11.7 % (ref 11–15)
FERRITIN SERPL-MCNC: 113 NG/ML (ref 16–154)
GFR/BSA.PRED SERPLBLD CYS-BASED-ARV: 120 ML/MIN/1.73M2
GLOBULIN SER CALC-MCNC: 2.3 G/DL (CALC) (ref 1.9–3.7)
GLUCOSE SERPL-MCNC: 85 MG/DL (ref 65–99)
HCT VFR BLD AUTO: 42.3 % (ref 35–45)
HDLC SERPL-MCNC: 55 MG/DL
HGB BLD-MCNC: 14.3 G/DL (ref 11.7–15.5)
IRON SATN MFR SERPL: 60 % (CALC) (ref 16–45)
IRON SERPL-MCNC: 177 MCG/DL (ref 40–190)
LDLC SERPL CALC-MCNC: 63 MG/DL (CALC)
LYMPHOCYTES # BLD AUTO: 1733 CELLS/UL (ref 850–3900)
LYMPHOCYTES NFR BLD AUTO: 23.1 %
MCH RBC QN AUTO: 32 PG (ref 27–33)
MCHC RBC AUTO-ENTMCNC: 33.8 G/DL (ref 32–36)
MCV RBC AUTO: 94.6 FL (ref 80–100)
MONOCYTES # BLD AUTO: 668 CELLS/UL (ref 200–950)
MONOCYTES NFR BLD AUTO: 8.9 %
NEUTROPHILS # BLD AUTO: 4740 CELLS/UL (ref 1500–7800)
NEUTROPHILS NFR BLD AUTO: 63.2 %
NONHDLC SERPL-MCNC: 77 MG/DL (CALC)
PLATELET # BLD AUTO: 212 THOUSAND/UL (ref 140–400)
PMV BLD REES-ECKER: 11 FL (ref 7.5–12.5)
POTASSIUM SERPL-SCNC: 4.6 MMOL/L (ref 3.5–5.3)
PROT SERPL-MCNC: 7.1 G/DL (ref 6.1–8.1)
RBC # BLD AUTO: 4.47 MILLION/UL (ref 3.8–5.1)
SODIUM SERPL-SCNC: 140 MMOL/L (ref 135–146)
TIBC SERPL-MCNC: 294 MCG/DL (CALC) (ref 250–450)
TRIGL SERPL-MCNC: 61 MG/DL
TSH SERPL-ACNC: 1.53 MIU/L
WBC # BLD AUTO: 7.5 THOUSAND/UL (ref 3.8–10.8)